# Patient Record
Sex: FEMALE | Race: WHITE | URBAN - METROPOLITAN AREA
[De-identification: names, ages, dates, MRNs, and addresses within clinical notes are randomized per-mention and may not be internally consistent; named-entity substitution may affect disease eponyms.]

---

## 2022-08-04 PROBLEM — N64.52 BLOODY DISCHARGE FROM LEFT NIPPLE: Status: ACTIVE | Noted: 2022-08-04

## 2022-08-29 PROBLEM — R87.810 CERVICAL HIGH RISK HPV (HUMAN PAPILLOMAVIRUS) TEST POSITIVE: Status: ACTIVE | Noted: 2022-07-11

## 2023-06-30 DIAGNOSIS — M75.42 IMPINGEMENT SYNDROME OF LEFT SHOULDER: ICD-10-CM

## 2023-06-30 DIAGNOSIS — M75.52 BURSITIS OF LEFT SHOULDER: ICD-10-CM

## 2023-06-30 DIAGNOSIS — M25.512 LEFT SHOULDER PAIN, UNSPECIFIED CHRONICITY: ICD-10-CM

## 2023-07-05 ENCOUNTER — OFFICE VISIT (OUTPATIENT)
Dept: ORTHOPEDIC SURGERY | Age: 43
End: 2023-07-05

## 2023-07-05 DIAGNOSIS — M25.512 LEFT SHOULDER PAIN, UNSPECIFIED CHRONICITY: Primary | ICD-10-CM

## 2023-07-05 DIAGNOSIS — S49.80XA SHOULDER INJURY RELATED TO VACCINE ADMINISTRATION (SIRVA): ICD-10-CM

## 2023-07-05 DIAGNOSIS — T50.Z95A SHOULDER INJURY RELATED TO VACCINE ADMINISTRATION (SIRVA): ICD-10-CM

## 2023-07-05 DIAGNOSIS — M75.52 BURSITIS OF LEFT SHOULDER: ICD-10-CM

## 2023-07-05 DIAGNOSIS — M75.42 IMPINGEMENT SYNDROME OF LEFT SHOULDER: ICD-10-CM

## 2023-07-05 NOTE — PROGRESS NOTES
Patient was fitted and instructed on the use of Arm Sling for the left shoulder. Patient is aware the arm should fit comfortably in the sling with the elbow as far back as possible. I explained the thumb should be placed in the thumb loop to help prevent the arm from sliding out of the sling. Patient was instructed that the shoulder strap should cross over the opposite shoulder continuing threw the loop attached to the sling and then velcro back on the shoulder strap. Patient read and signed documenting they understand and agree to [facility]'s current DME return policy.
cartilage thinning   and possible early subchondral changes anterior and inferiorly. 4.  Rotator cuff intact. Reviewed and shows some tendinopathy of the biceps as well as the findings above  There is possibly some splitting of the long head of the biceps prior to the groove. A/Plan:     ICD-10-CM    1. Left shoulder pain, unspecified chronicity  M25.512       2. Bursitis of left shoulder  M75.52       3. Shoulder injury related to vaccine administration (SIRVA)  S49.80XA     T50.Z95A       4. Impingement syndrome of left shoulder  M75.42            We discussed all her options. She has exhausted conservative treatment and has failed to have significant relief preventing her from performing activities that she enjoys in life. She would like to continue to race and has determined that she would like to proceed with surgery. I discussed with her based on her exam as well as review of the MRI that I think her symptoms are likely coming from the long head of her biceps and would proceed with arthroscopic debridement and open subpectoral biceps tenodesis and clinically indicated procedures. The patient desires arthroscopy of the left shoulder to include arthroscopic diagnostic evaluation with debridement and biceps tenotomy for open subpectoral biceps tenodesis. I talked with them extensively about the risks, benefits, reasonable expectations and expected recovery time as well as possible complications including but not limited to bleeding, infection, neurovascular injury, stiffness, cosmetic abnormality of skin or muscle for biceps related surgery, pain, continued problems, DVT, PE, hardware failure, fracture, heterotopic ossification, MI and other anesthesia related risks, etc.  They seem to understand and wish to proceed. I gave them education literature and answered their questions. Return for Surgery.         Rosaline Fothergill, MD  07/05/23

## 2023-07-10 ENCOUNTER — TELEPHONE (OUTPATIENT)
Dept: ORTHOPEDIC SURGERY | Age: 43
End: 2023-07-10

## 2023-07-10 NOTE — TELEPHONE ENCOUNTER
Spoke with pt and let her know that I have reached out to Estelle Rossi to give her a call with a time that she can come and  the ice machine. She expressed understanding and thanked me for calling.

## 2023-07-10 NOTE — TELEPHONE ENCOUNTER
She has decided she does want the ice man for her Aug 3 surgery. How does she get this? Please let her know.

## 2023-07-20 ENCOUNTER — OFFICE VISIT (OUTPATIENT)
Dept: ORTHOPEDIC SURGERY | Age: 43
End: 2023-07-20

## 2023-07-20 DIAGNOSIS — M25.512 LEFT SHOULDER PAIN, UNSPECIFIED CHRONICITY: Primary | ICD-10-CM

## 2023-07-20 NOTE — PROGRESS NOTES
I explained and demonstrated all information to the patient about how to properly use the machine. It will be used to help reduce pain and swelling, in turn facilitate healing and speed up the rehabilitation process. The patient was instructed on how to properly fill the machine with ice and water as well as the importance to ALWAYS use a barrier between your skin and the cold pad to avoid additional injury. The patient was instructed to take the machine to the hospital with them the morning of their surgery. Patient was advised that if they had any questions about the Ice Man Machine or how to properly use it to please call me at [phone]. Patient read and signed documenting they understand and agree to [facility]'s current DME return policy.

## 2023-07-31 NOTE — PERIOP NOTE
Patient verified name and . Order for consent NOT found in EHR at time of PAT visit. Unable to verify case posting against order; surgery verified by patient. Type 1B surgery, PAT phone assessment complete. Orders not received. Labs per surgeon: unknown; no orders received    Labs per anesthesia protocol: none indicated    Patient answered medical/surgical history questions at their best of ability. All prior to admission medications documented in EPIC. Patient instructed to take the following medications the day of surgery according to anesthesia guidelines with a small sip of water: none On the day before surgery please take Acetaminophen 1000mg in the morning and then again before bed. You may substitute for Tylenol 650 mg. Hold all vitamins 7 days prior to surgery and NSAIDS 5 days prior to surgery. Prescription meds to hold:vitamins and supplements  Patient instructed on the following:    > Arrive at [facility], time of arrival to be called the day before by 1700  > NPO after midnight, unless otherwise indicated, including gum, mints, and ice chips  > Responsible adult must drive patient to the hospital, stay during surgery, and patient will need supervision 24 hours after anesthesia  > Use antibacterial soap in shower the night before surgery and on the morning of surgery  > All piercings must be removed prior to arrival.    > Leave all valuables (money and jewelry) at home but bring insurance card and ID on DOS.   > You may be required to pay a deductible or co-pay on the day of your procedure. You can pre-pay by calling [phone] if your surgery is at the [facility] or [phone] if your surgery is at the [facility]. > Do not wear make-up, nail polish, lotions, cologne, perfumes, powders, or oil on skin. Artificial nails are not permitted.

## 2023-08-02 ENCOUNTER — ANESTHESIA EVENT (OUTPATIENT)
Dept: SURGERY | Age: 43
End: 2023-08-02
Payer: COMMERCIAL

## 2023-08-02 DIAGNOSIS — M75.42 IMPINGEMENT SYNDROME OF LEFT SHOULDER: Primary | ICD-10-CM

## 2023-08-02 RX ORDER — AMOXICILLIN 250 MG
1 CAPSULE ORAL DAILY
Qty: 21 TABLET | Refills: 0 | Status: SHIPPED | OUTPATIENT
Start: 2023-08-02

## 2023-08-02 RX ORDER — OXYCODONE AND ACETAMINOPHEN 7.5; 325 MG/1; MG/1
1-2 TABLET ORAL EVERY 6 HOURS PRN
Qty: 30 TABLET | Refills: 0 | Status: SHIPPED | OUTPATIENT
Start: 2023-08-02 | End: 2023-08-05

## 2023-08-02 RX ORDER — MELOXICAM 7.5 MG/1
7.5 TABLET ORAL 2 TIMES DAILY PRN
Qty: 28 TABLET | Refills: 0 | Status: SHIPPED | OUTPATIENT
Start: 2023-08-02 | End: 2023-08-16

## 2023-08-02 RX ORDER — NALOXONE HYDROCHLORIDE 4 MG/.1ML
1 SPRAY NASAL PRN
Qty: 1 EACH | Refills: 1 | Status: SHIPPED | OUTPATIENT
Start: 2023-08-02

## 2023-08-02 RX ORDER — ASPIRIN 325 MG
325 TABLET ORAL DAILY
Qty: 7 TABLET | Refills: 0 | Status: SHIPPED | OUTPATIENT
Start: 2023-08-02 | End: 2023-08-09

## 2023-08-02 RX ORDER — ONDANSETRON 8 MG/1
4 TABLET, ORALLY DISINTEGRATING ORAL EVERY 6 HOURS
Qty: 16 TABLET | Refills: 0 | Status: SHIPPED | OUTPATIENT
Start: 2023-08-02

## 2023-08-02 NOTE — PERIOP NOTE
Preop department called to notify patient of arrival time for scheduled procedure. Instructions given to   - Arrive at [address]. - Remain NPO after midnight, unless otherwise indicated, including gum, mints, and ice chips. - Have a responsible adult to drive patient to the hospital, stay during surgery, and patient will need supervision 24 hours after anesthesia. - Use antibacterial soap in shower the night before surgery and on the morning of surgery.        Was patient contacted: Yes  Voicemail left:   Numbers contacted: [phone]   Arrival time: 2187

## 2023-08-03 ENCOUNTER — HOSPITAL ENCOUNTER (OUTPATIENT)
Age: 43
Setting detail: OUTPATIENT SURGERY
Discharge: HOME | End: 2023-08-03
Attending: ORTHOPAEDIC SURGERY | Admitting: ORTHOPAEDIC SURGERY
Payer: COMMERCIAL

## 2023-08-03 ENCOUNTER — ANESTHESIA (OUTPATIENT)
Dept: SURGERY | Age: 43
End: 2023-08-03
Payer: COMMERCIAL

## 2023-08-03 ENCOUNTER — TELEPHONE (OUTPATIENT)
Dept: ORTHOPEDIC SURGERY | Age: 43
End: 2023-08-03

## 2023-08-03 VITALS
TEMPERATURE: 97 F | DIASTOLIC BLOOD PRESSURE: 57 MMHG | HEIGHT: 68 IN | OXYGEN SATURATION: 95 % | SYSTOLIC BLOOD PRESSURE: 132 MMHG | RESPIRATION RATE: 18 BRPM | WEIGHT: 148 LBS | BODY MASS INDEX: 22.43 KG/M2 | HEART RATE: 87 BPM

## 2023-08-03 DIAGNOSIS — M75.52 BURSITIS OF LEFT SHOULDER: ICD-10-CM

## 2023-08-03 DIAGNOSIS — S49.80XA SHOULDER INJURY RELATED TO VACCINE ADMINISTRATION (SIRVA): ICD-10-CM

## 2023-08-03 DIAGNOSIS — M75.42 IMPINGEMENT SYNDROME OF LEFT SHOULDER: ICD-10-CM

## 2023-08-03 DIAGNOSIS — T50.Z95A SHOULDER INJURY RELATED TO VACCINE ADMINISTRATION (SIRVA): ICD-10-CM

## 2023-08-03 LAB — HCG UR QL: NEGATIVE

## 2023-08-03 PROCEDURE — 7100000001 HC PACU RECOVERY - ADDTL 15 MIN: Performed by: ORTHOPAEDIC SURGERY

## 2023-08-03 PROCEDURE — C1713 ANCHOR/SCREW BN/BN,TIS/BN: HCPCS | Performed by: ORTHOPAEDIC SURGERY

## 2023-08-03 PROCEDURE — 64415 NJX AA&/STRD BRCH PLXS IMG: CPT | Performed by: ANESTHESIOLOGY

## 2023-08-03 PROCEDURE — 2580000003 HC RX 258

## 2023-08-03 PROCEDURE — 7100000011 HC PHASE II RECOVERY - ADDTL 15 MIN: Performed by: ORTHOPAEDIC SURGERY

## 2023-08-03 PROCEDURE — 6370000000 HC RX 637 (ALT 250 FOR IP): Performed by: ANESTHESIOLOGY

## 2023-08-03 PROCEDURE — 7100000010 HC PHASE II RECOVERY - FIRST 15 MIN: Performed by: ORTHOPAEDIC SURGERY

## 2023-08-03 PROCEDURE — 6360000002 HC RX W HCPCS

## 2023-08-03 PROCEDURE — 6360000002 HC RX W HCPCS: Performed by: ANESTHESIOLOGY

## 2023-08-03 PROCEDURE — 6360000002 HC RX W HCPCS: Performed by: ORTHOPAEDIC SURGERY

## 2023-08-03 PROCEDURE — 3600000004 HC SURGERY LEVEL 4 BASE: Performed by: ORTHOPAEDIC SURGERY

## 2023-08-03 PROCEDURE — 7100000000 HC PACU RECOVERY - FIRST 15 MIN: Performed by: ORTHOPAEDIC SURGERY

## 2023-08-03 PROCEDURE — 2580000003 HC RX 258: Performed by: ANESTHESIOLOGY

## 2023-08-03 PROCEDURE — 3700000001 HC ADD 15 MINUTES (ANESTHESIA): Performed by: ORTHOPAEDIC SURGERY

## 2023-08-03 PROCEDURE — 3700000000 HC ANESTHESIA ATTENDED CARE: Performed by: ORTHOPAEDIC SURGERY

## 2023-08-03 PROCEDURE — 81025 URINE PREGNANCY TEST: CPT

## 2023-08-03 PROCEDURE — 2500000003 HC RX 250 WO HCPCS: Performed by: ORTHOPAEDIC SURGERY

## 2023-08-03 PROCEDURE — 2720000010 HC SURG SUPPLY STERILE: Performed by: ORTHOPAEDIC SURGERY

## 2023-08-03 PROCEDURE — 2500000003 HC RX 250 WO HCPCS

## 2023-08-03 PROCEDURE — 3600000014 HC SURGERY LEVEL 4 ADDTL 15MIN: Performed by: ORTHOPAEDIC SURGERY

## 2023-08-03 PROCEDURE — 6360000002 HC RX W HCPCS: Performed by: PHYSICIAN ASSISTANT

## 2023-08-03 PROCEDURE — 2709999900 HC NON-CHARGEABLE SUPPLY: Performed by: ORTHOPAEDIC SURGERY

## 2023-08-03 DEVICE — ANCHOR SUT DIA2.6MM SFT ROT CUF FIBERTAK: Type: IMPLANTABLE DEVICE | Site: SHOULDER | Status: FUNCTIONAL

## 2023-08-03 RX ORDER — TRANEXAMIC ACID 100 MG/ML
INJECTION, SOLUTION INTRAVENOUS PRN
Status: DISCONTINUED | OUTPATIENT
Start: 2023-08-03 | End: 2023-08-03 | Stop reason: SDUPTHER

## 2023-08-03 RX ORDER — EPHEDRINE SULFATE/0.9% NACL/PF 50 MG/5 ML
SYRINGE (ML) INTRAVENOUS PRN
Status: DISCONTINUED | OUTPATIENT
Start: 2023-08-03 | End: 2023-08-03 | Stop reason: SDUPTHER

## 2023-08-03 RX ORDER — ROCURONIUM BROMIDE 10 MG/ML
INJECTION, SOLUTION INTRAVENOUS PRN
Status: DISCONTINUED | OUTPATIENT
Start: 2023-08-03 | End: 2023-08-03 | Stop reason: SDUPTHER

## 2023-08-03 RX ORDER — NEOSTIGMINE METHYLSULFATE 1 MG/ML
INJECTION, SOLUTION INTRAVENOUS PRN
Status: DISCONTINUED | OUTPATIENT
Start: 2023-08-03 | End: 2023-08-03 | Stop reason: SDUPTHER

## 2023-08-03 RX ORDER — DEXAMETHASONE SODIUM PHOSPHATE 4 MG/ML
INJECTION, SOLUTION INTRA-ARTICULAR; INTRALESIONAL; INTRAMUSCULAR; INTRAVENOUS; SOFT TISSUE PRN
Status: DISCONTINUED | OUTPATIENT
Start: 2023-08-03 | End: 2023-08-03 | Stop reason: SDUPTHER

## 2023-08-03 RX ORDER — GLYCOPYRROLATE 0.2 MG/ML
INJECTION INTRAMUSCULAR; INTRAVENOUS PRN
Status: DISCONTINUED | OUTPATIENT
Start: 2023-08-03 | End: 2023-08-03 | Stop reason: SDUPTHER

## 2023-08-03 RX ORDER — ROPIVACAINE HYDROCHLORIDE 5 MG/ML
INJECTION, SOLUTION EPIDURAL; INFILTRATION; PERINEURAL
Status: COMPLETED | OUTPATIENT
Start: 2023-08-03 | End: 2023-08-03

## 2023-08-03 RX ORDER — OXYCODONE HYDROCHLORIDE 5 MG/1
5 TABLET ORAL
Status: COMPLETED | OUTPATIENT
Start: 2023-08-03 | End: 2023-08-03

## 2023-08-03 RX ORDER — SODIUM CHLORIDE 0.9 % (FLUSH) 0.9 %
5-40 SYRINGE (ML) INJECTION PRN
Status: DISCONTINUED | OUTPATIENT
Start: 2023-08-03 | End: 2023-08-03 | Stop reason: HOSPADM

## 2023-08-03 RX ORDER — ONDANSETRON 2 MG/ML
4 INJECTION INTRAMUSCULAR; INTRAVENOUS
Status: DISCONTINUED | OUTPATIENT
Start: 2023-08-03 | End: 2023-08-03 | Stop reason: HOSPADM

## 2023-08-03 RX ORDER — PROPOFOL 10 MG/ML
INJECTION, EMULSION INTRAVENOUS PRN
Status: DISCONTINUED | OUTPATIENT
Start: 2023-08-03 | End: 2023-08-03 | Stop reason: SDUPTHER

## 2023-08-03 RX ORDER — ACETAMINOPHEN 500 MG
1000 TABLET ORAL ONCE
Status: COMPLETED | OUTPATIENT
Start: 2023-08-03 | End: 2023-08-03

## 2023-08-03 RX ORDER — SODIUM CHLORIDE 9 MG/ML
INJECTION, SOLUTION INTRAVENOUS PRN
Status: DISCONTINUED | OUTPATIENT
Start: 2023-08-03 | End: 2023-08-03 | Stop reason: HOSPADM

## 2023-08-03 RX ORDER — SODIUM CHLORIDE, SODIUM LACTATE, POTASSIUM CHLORIDE, CALCIUM CHLORIDE 600; 310; 30; 20 MG/100ML; MG/100ML; MG/100ML; MG/100ML
INJECTION, SOLUTION INTRAVENOUS CONTINUOUS
Status: DISCONTINUED | OUTPATIENT
Start: 2023-08-03 | End: 2023-08-03 | Stop reason: HOSPADM

## 2023-08-03 RX ORDER — SODIUM CHLORIDE 0.9 % (FLUSH) 0.9 %
5-40 SYRINGE (ML) INJECTION EVERY 12 HOURS SCHEDULED
Status: DISCONTINUED | OUTPATIENT
Start: 2023-08-03 | End: 2023-08-03 | Stop reason: HOSPADM

## 2023-08-03 RX ORDER — ONDANSETRON 2 MG/ML
INJECTION INTRAMUSCULAR; INTRAVENOUS PRN
Status: DISCONTINUED | OUTPATIENT
Start: 2023-08-03 | End: 2023-08-03 | Stop reason: SDUPTHER

## 2023-08-03 RX ORDER — HYDROMORPHONE HYDROCHLORIDE 2 MG/ML
0.5 INJECTION, SOLUTION INTRAMUSCULAR; INTRAVENOUS; SUBCUTANEOUS EVERY 5 MIN PRN
Status: DISCONTINUED | OUTPATIENT
Start: 2023-08-03 | End: 2023-08-03 | Stop reason: HOSPADM

## 2023-08-03 RX ORDER — LIDOCAINE HYDROCHLORIDE 20 MG/ML
INJECTION, SOLUTION EPIDURAL; INFILTRATION; INTRACAUDAL; PERINEURAL PRN
Status: DISCONTINUED | OUTPATIENT
Start: 2023-08-03 | End: 2023-08-03 | Stop reason: SDUPTHER

## 2023-08-03 RX ORDER — FENTANYL CITRATE 50 UG/ML
INJECTION, SOLUTION INTRAMUSCULAR; INTRAVENOUS PRN
Status: DISCONTINUED | OUTPATIENT
Start: 2023-08-03 | End: 2023-08-03 | Stop reason: SDUPTHER

## 2023-08-03 RX ORDER — DIPHENHYDRAMINE HYDROCHLORIDE 50 MG/ML
12.5 INJECTION INTRAMUSCULAR; INTRAVENOUS
Status: DISCONTINUED | OUTPATIENT
Start: 2023-08-03 | End: 2023-08-03 | Stop reason: HOSPADM

## 2023-08-03 RX ORDER — LIDOCAINE HYDROCHLORIDE AND EPINEPHRINE 10; 10 MG/ML; UG/ML
INJECTION, SOLUTION INFILTRATION; PERINEURAL PRN
Status: DISCONTINUED | OUTPATIENT
Start: 2023-08-03 | End: 2023-08-03 | Stop reason: ALTCHOICE

## 2023-08-03 RX ORDER — MIDAZOLAM HYDROCHLORIDE 2 MG/2ML
2 INJECTION, SOLUTION INTRAMUSCULAR; INTRAVENOUS
Status: COMPLETED | OUTPATIENT
Start: 2023-08-03 | End: 2023-08-03

## 2023-08-03 RX ORDER — SCOLOPAMINE TRANSDERMAL SYSTEM 1 MG/1
1 PATCH, EXTENDED RELEASE TRANSDERMAL
Status: DISCONTINUED | OUTPATIENT
Start: 2023-08-03 | End: 2023-08-03 | Stop reason: HOSPADM

## 2023-08-03 RX ORDER — EPINEPHRINE 1 MG/ML(1)
AMPUL (ML) INJECTION PRN
Status: DISCONTINUED | OUTPATIENT
Start: 2023-08-03 | End: 2023-08-03 | Stop reason: ALTCHOICE

## 2023-08-03 RX ORDER — APREPITANT 40 MG/1
40 CAPSULE ORAL ONCE
Status: COMPLETED | OUTPATIENT
Start: 2023-08-03 | End: 2023-08-03

## 2023-08-03 RX ORDER — FENTANYL CITRATE 50 UG/ML
100 INJECTION, SOLUTION INTRAMUSCULAR; INTRAVENOUS
Status: COMPLETED | OUTPATIENT
Start: 2023-08-03 | End: 2023-08-03

## 2023-08-03 RX ADMIN — TRANEXAMIC ACID 1000 MG: 100 INJECTION, SOLUTION INTRAVENOUS at 11:46

## 2023-08-03 RX ADMIN — ONDANSETRON 4 MG: 2 INJECTION INTRAMUSCULAR; INTRAVENOUS at 11:46

## 2023-08-03 RX ADMIN — ROPIVACAINE HYDROCHLORIDE 30 ML: 5 INJECTION, SOLUTION EPIDURAL; INFILTRATION; PERINEURAL at 10:42

## 2023-08-03 RX ADMIN — ACETAMINOPHEN 1000 MG: 500 TABLET, FILM COATED ORAL at 09:11

## 2023-08-03 RX ADMIN — APREPITANT 40 MG: 40 CAPSULE ORAL at 09:34

## 2023-08-03 RX ADMIN — FENTANYL CITRATE 50 MCG: 50 INJECTION, SOLUTION INTRAMUSCULAR; INTRAVENOUS at 12:45

## 2023-08-03 RX ADMIN — Medication 10 MG: at 11:58

## 2023-08-03 RX ADMIN — FENTANYL CITRATE 50 MCG: 50 INJECTION, SOLUTION INTRAMUSCULAR; INTRAVENOUS at 12:26

## 2023-08-03 RX ADMIN — SODIUM CHLORIDE, SODIUM LACTATE, POTASSIUM CHLORIDE, AND CALCIUM CHLORIDE: 600; 310; 30; 20 INJECTION, SOLUTION INTRAVENOUS at 09:12

## 2023-08-03 RX ADMIN — PHENYLEPHRINE HYDROCHLORIDE 50 MCG: 10 INJECTION INTRAVENOUS at 12:06

## 2023-08-03 RX ADMIN — OXYCODONE HYDROCHLORIDE 5 MG: 5 TABLET ORAL at 13:31

## 2023-08-03 RX ADMIN — GLYCOPYRROLATE 0.4 MG: 0.2 INJECTION INTRAMUSCULAR; INTRAVENOUS at 12:42

## 2023-08-03 RX ADMIN — SODIUM CHLORIDE, SODIUM LACTATE, POTASSIUM CHLORIDE, AND CALCIUM CHLORIDE: 600; 310; 30; 20 INJECTION, SOLUTION INTRAVENOUS at 12:44

## 2023-08-03 RX ADMIN — DEXAMETHASONE SODIUM PHOSPHATE 4 MG: 4 INJECTION, SOLUTION INTRAMUSCULAR; INTRAVENOUS at 11:46

## 2023-08-03 RX ADMIN — Medication 3 MG: at 12:42

## 2023-08-03 RX ADMIN — Medication 5 MG: at 12:17

## 2023-08-03 RX ADMIN — Medication 2000 MG: at 11:46

## 2023-08-03 RX ADMIN — ROCURONIUM BROMIDE 40 MG: 10 INJECTION, SOLUTION INTRAVENOUS at 11:50

## 2023-08-03 RX ADMIN — Medication 5 MG: at 12:01

## 2023-08-03 RX ADMIN — PHENYLEPHRINE HYDROCHLORIDE 50 MCG: 10 INJECTION INTRAVENOUS at 12:24

## 2023-08-03 RX ADMIN — PHENYLEPHRINE HYDROCHLORIDE 100 MCG: 10 INJECTION INTRAVENOUS at 12:36

## 2023-08-03 RX ADMIN — PROPOFOL 200 MG: 10 INJECTION, EMULSION INTRAVENOUS at 11:50

## 2023-08-03 RX ADMIN — PHENYLEPHRINE HYDROCHLORIDE 50 MCG: 10 INJECTION INTRAVENOUS at 12:01

## 2023-08-03 RX ADMIN — LIDOCAINE HYDROCHLORIDE 60 MG: 20 INJECTION, SOLUTION EPIDURAL; INFILTRATION; INTRACAUDAL; PERINEURAL at 11:50

## 2023-08-03 RX ADMIN — FENTANYL CITRATE 100 MCG: 50 INJECTION INTRAMUSCULAR; INTRAVENOUS at 10:42

## 2023-08-03 RX ADMIN — PHENYLEPHRINE HYDROCHLORIDE 50 MCG: 10 INJECTION INTRAVENOUS at 12:17

## 2023-08-03 RX ADMIN — PHENYLEPHRINE HYDROCHLORIDE 50 MCG: 10 INJECTION INTRAVENOUS at 12:33

## 2023-08-03 RX ADMIN — MIDAZOLAM 2 MG: 1 INJECTION INTRAMUSCULAR; INTRAVENOUS at 10:42

## 2023-08-03 RX ADMIN — Medication 5 MG: at 12:24

## 2023-08-03 ASSESSMENT — PAIN DESCRIPTION - ORIENTATION: ORIENTATION: LEFT

## 2023-08-03 ASSESSMENT — PAIN DESCRIPTION - LOCATION: LOCATION: SHOULDER

## 2023-08-03 ASSESSMENT — PAIN SCALES - GENERAL
PAINLEVEL_OUTOF10: 4
PAINLEVEL_OUTOF10: 0

## 2023-08-03 NOTE — ANESTHESIA PROCEDURE NOTES
Airway  Date/Time: 8/3/2023 11:53 AM  Urgency: elective    Airway not difficult    General Information and Staff    Patient location during procedure: OR  Resident/CRNA: OSVALDO Lo CRNA  Performed: resident/CRNA     Indications and Patient Condition  Indications for airway management: anesthesia  Spontaneous Ventilation: absent  Sedation level: deep  Preoxygenated: yes  Patient position: sniffing  MILS not maintained throughout  Mask difficulty assessment: vent by bag mask    Final Airway Details  Final airway type: endotracheal airway      Successful airway: ETT  Cuffed: yes   Successful intubation technique: direct laryngoscopy  Facilitating devices/methods: intubating stylet  Endotracheal tube insertion site: oral  Blade: Johnathan  Blade size: #3  ETT size (mm): 7.0  Cormack-Lehane Classification: grade I - full view of glottis  Placement verified by: chest auscultation and capnometry   Measured from: lips  ETT to lips (cm): 21  Number of attempts at approach: 1  Ventilation between attempts: bag mask  Number of other approaches attempted: 0    Additional Comments  PreO2. Standard IV induction by MDA. Atraumatic insertion. Positive equal and bilateral breath sounds noted with positive ETCO2 present. Lips and dentition unchanged from pre-op.     no
adjunct  Medications Administered  ropivacaine (NAROPIN) injection 0.5% - Perineural   30 mL - 8/3/2023 10:42:00 AM

## 2023-08-03 NOTE — PERIOP NOTE
PACU DISCHARGE NOTE    Pt and fiance verbalized understanding of discharge inst. Pt tolerated po fluids well. Pt discharged home with iceman to left shoulder. Vital signs stable, pain well controlled, alert and oriented times three or at baseline, follow up per surgeon, no anesthetic complications.

## 2023-08-03 NOTE — ANESTHESIA PRE PROCEDURE
Department of Anesthesiology  Preprocedure Note       Name:  Kanwal Pat   Age:  38 y.o.  :  1980                                          MRN:  [id number]         Date:  8/3/2023      Surgeon: Trinidad Live): Moriah Nassar MD    Procedure: Procedure(s):  LEFT SHOULDER SCOPE DEBRIDEMENT -REGIONAL BLOCK  OPEN BICEPS TENODESIS -REGIONAL BLOCK    Medications prior to admission:   Prior to Admission medications    Medication Sig Start Date End Date Taking? Authorizing Provider   NONFORMULARY Cardona Juice- Super Hair Vitamin   Yes Historical Provider, MD   aspirin 325 MG tablet Take 1 tablet by mouth daily for 7 days Start after surgery 23  OG Dsouza   meloxicam (MOBIC) 7.5 MG tablet Take 1 tablet by mouth 2 times daily as needed for Pain Take 1 tablet po BID for 2 weeks as needed for pain 23  OG Dsouza   naloxone [facility]) 4 MG/0.1ML LIQD nasal spray 1 spray by Nasal route as needed for Opioid Reversal 23   OG Dsouza   oxyCODONE-acetaminophen (PERCOCET) 7.5-325 MG per tablet Take 1-2 tablets by mouth every 6 hours as needed for Pain for up to 3 days. Start medication night of surgery. Max Daily Amount: 8 tablets 23  OG Dsouza   senna-docusate (PERICOLACE) 8.6-50 MG per tablet Take 1 tablet by mouth daily Take for constipation prophylaxis 23   OG Dsouza   ondansetron (ZOFRAN-ODT) 8 MG TBDP disintegrating tablet Place 0.5 tablets under the tongue in the morning and 0.5 tablets at noon and 0.5 tablets in the evening and 0.5 tablets before bedtime. Take 20 minutes prior to pain medication for nausea prevention.  23   OG Dsouza   Turmeric (QC TUMERIC COMPLEX PO) Take by mouth Once a day, PM  Patient not taking: Reported on 2023    Historical Provider, MD   loratadine (CLARITIN) 10 MG capsule Take 10 mg by mouth daily  Patient not taking: Reported on 2023    Historical Provider, MD   naproxen (EC-NAPROSYN) 500 MG EC tablet

## 2023-08-03 NOTE — OP NOTE
Operative Note    Preoperative diagnosis:  Left shoulder pain, unspecified chronicity [M25.512]  Bursitis of left shoulder [M75.52]  Shoulder injury related to vaccine administration (SIRVA) [S49.80XA, T50.Z95A]  Impingement syndrome of left shoulder [M75.42]    Postoperative diagnosis: Left shoulder pain, bicipital instability/tendinitis    Surgeon(s) and Role:     * RAHEL Duffy MD - Primary     Assistant: first jackie Tolliver, assisted during the procedure. She was necessary for patient positioning, wound closure, and assistance with the major portions of the operation. Her presence decreased the operative time and potential complication rate. Anesthesia: Choice    Antibiotics: Ancef 2 g IV    Procedures:  Procedure(s):  LEFT SHOULDER SCOPE DEBRIDEMENT  OPEN BICEPS TENODESIS 81440  Biceps open subpectoral Tenodesis 70483    Findings:  1. EUA -patient had full range of motion that was symmetric both sides with no evidence of instability. 2. Intra-articular -the glenoid cartilage centrally in the humeral head cartilage was normal.  There was mild fraying of the anterior chondral labral junction but no evidence of tearing or instability to probing. This was debrided with a shaver. The superior labrum appeared relatively stable to probing. The tendon itself was fairly normal but when probed the biceps tendon at the superior aspect of the groove would sublux over the anterior aspect and superior aspect of the subscapularis both with probing but also with dynamic abduction shear maneuver. The rotator cuff appeared normal from the articular side  3. Subacromial -mild bursitis, no evidence of significant rotator cuff pathology or impingement  4. AC joint -asymptomatic    Indications / Consent:  this is a patient who has persistent pain with some weakness in the right shoulder. They had an MRI that showed possible bicipital instability or medial subluxation.   After previous discussions and

## 2023-08-03 NOTE — TELEPHONE ENCOUNTER
She had surgery this morning and an antibiotic was never sent in. Please send to the pharmacy on file. She said there was some back and forth on what she can take and maybe that's why it was never sent.

## 2023-08-08 ENCOUNTER — HOSPITAL ENCOUNTER (OUTPATIENT)
Dept: PHYSICAL THERAPY | Age: 43
Setting detail: RECURRING SERIES
Discharge: HOME | End: 2023-08-11
Attending: ORTHOPAEDIC SURGERY
Payer: COMMERCIAL

## 2023-08-08 PROCEDURE — 97162 PT EVAL MOD COMPLEX 30 MIN: CPT

## 2023-08-08 PROCEDURE — 97140 MANUAL THERAPY 1/> REGIONS: CPT

## 2023-08-08 PROCEDURE — 97016 VASOPNEUMATIC DEVICE THERAPY: CPT

## 2023-08-08 ASSESSMENT — PAIN SCALES - GENERAL: PAINLEVEL_OUTOF10: 2

## 2023-08-08 NOTE — PROGRESS NOTES
Mike Colbert  : 1980  Primary: Maryeunice Robbins ([employer] QUINTANILLA)  Secondary:  [address] [facility] [address]  [facility] [address]  Phone: [phone]  Fax: [phone] Plan Frequency: twice a week for 12 weeks    Plan of Care/Certification Expiration Date: 23      PT Visit Info:  Plan Frequency: twice a week for 12 weeks  Plan of Care/Certification Expiration Date: 23  Progress Note Counter: 1      Visit Count:  1    OUTPATIENT PHYSICAL THERAPY:OP NOTE TYPE: Treatment Note 2023       Episode  }Appt Desk             Treatment Diagnosis:  Pain in left shoulder (M25.512)   Stiffness of left shoulder, not elsewhere classified (M25.612)    Medical/Referring Diagnosis:  Left shoulder pain, unspecified chronicity [M25.512]  Bursitis of left shoulder [M75.52]  Shoulder injury related to vaccine administration (SIRVA) [S49.80XA, T50.Z95A]  Impingement syndrome of left shoulder [M75.42]  Referring Physician:  Loki Mederos MD MD Orders:  PT Eval and Treat   Date of Onset:  Onset Date: 23     Allergies:   Ciprofloxacin, Amoxicillin, and Penicillins  Restrictions/Precautions:  Restrictions/Precautions: Other (comment) (must wear sling at all times for 4 weeks, then 2 weeks outside of the house; states she cannot pick any weighted item up with her L UE)  No data recorded      Subjective Comments:  See history in chart.   Initial:}    2 (2-3)/10Post Session:        (no specific pain level reported)/10  Medications Last Reviewed:  2023  Updated Objective Findings:  See evaluation note from today  Treatment   In addition to assessment today, the following treatments were provided:   Manual Therapy: (13 minutes)   With pt in supported supine position:   - passive ROM to L shoulder (with L elbow flexed) into flexion, abduction, and ER at 15 degrees abduction to maximize L shoulder mobility and reduce pain    Modalities: (10 minutes) With pt in supported sitting

## 2023-08-08 NOTE — THERAPY EVALUATION
Peter Dayana  : 1980  Primary: Luisakim Whitmorejoseph Robbins ([employer])  Secondary:  [address] [facility] [address] [facility] [address]  Phone: [phone]  Fax: [phone] Plan Frequency: twice a week for 12 weeks    Plan of Care/Certification Expiration Date: 23      PT Visit Info:  Plan Frequency: twice a week for 12 weeks  Plan of Care/Certification Expiration Date: 23  Progress Note Counter: 1      Visit Count:  1                OUTPATIENT PHYSICAL THERAPY:             OP NOTE TYPE: Initial Assessment 2023               Episode (L shoulder pain s/p surgery) Appt Desk         Treatment Diagnosis:  Pain in left shoulder (M25.512)   Stiffness of left shoulder, not elsewhere classified (M25.612)    Medical/Referring Diagnosis:  Left shoulder pain, unspecified chronicity [M25.512]  Bursitis of left shoulder [M75.52]  Shoulder injury related to vaccine administration (SIRVA) [S49.80XA, T50.Z95A]  Impingement syndrome of left shoulder [M75.42]  Referring Physician:  Marcela Clemens MD MD Orders:  PT Eval and Treat   Return MD Appt:  unspecified  Date of Onset:  Onset Date: 23      Allergies:  Ciprofloxacin, Amoxicillin, and Penicillins  Restrictions/Precautions:    Restrictions/Precautions: Other (comment) (must wear sling at all times for 4 weeks, then 2 weeks outside of the house; states she cannot pick any weighted item up with her L UE)        Medications Last Reviewed:  2023     SUBJECTIVE   History of Injury/Illness (Reason for Referral):  Location(s) of Injury: L shoulder  Mechanism of Injury: pt had tetanus shot in L shoulder and has been having issues ever since (thinks this is due to bursitis, which may have led to her having to get surgery for biceps tendon per her report)  Date of Surgery:  Onset Date: 23      Type of Surgery: left shoulder scope debridement, open biceps tenodesis  Pain at Worst: 6-7/10  Aggravating

## 2023-08-11 ENCOUNTER — HOSPITAL ENCOUNTER (OUTPATIENT)
Dept: PHYSICAL THERAPY | Age: 43
Setting detail: RECURRING SERIES
Discharge: HOME | End: 2023-08-14
Attending: ORTHOPAEDIC SURGERY
Payer: COMMERCIAL

## 2023-08-11 PROCEDURE — 97140 MANUAL THERAPY 1/> REGIONS: CPT

## 2023-08-11 PROCEDURE — 97016 VASOPNEUMATIC DEVICE THERAPY: CPT

## 2023-08-11 PROCEDURE — 97110 THERAPEUTIC EXERCISES: CPT

## 2023-08-11 ASSESSMENT — PAIN SCALES - GENERAL: PAINLEVEL_OUTOF10: 3

## 2023-08-11 NOTE — PROGRESS NOTES
Mihir Rojelio  : 1980  Primary: Malgorzata Robbins ([facility])  Secondary:  Sharee Marie  [place] [address] [address] [facility]  Phone: [phone]  Fax: [phone] Plan Frequency: twice a week for 12 weeks    Plan of Care/Certification Expiration Date: 23      PT Visit Info:  Plan Frequency: twice a week for 12 weeks  Plan of Care/Certification Expiration Date: 23  Progress Note Counter: 2      Visit Count:  2    OUTPATIENT PHYSICAL THERAPY:OP NOTE TYPE: Treatment Note 2023       Episode  }Appt Desk             Treatment Diagnosis:  Pain in left shoulder (M25.512)   Stiffness of left shoulder, not elsewhere classified (M25.612)    Medical/Referring Diagnosis:  Left shoulder pain, unspecified chronicity [M25.512]  Bursitis of left shoulder [M75.52]  Shoulder injury related to vaccine administration (SIRVA) [S49.80XA, T50.Z95A]  Impingement syndrome of left shoulder [M75.42]  Referring Physician:  Diana Ramirez MD MD Orders:  PT Eval and Treat   Date of Onset:  Onset Date: 23     Allergies:   Ciprofloxacin, Amoxicillin, and Penicillins  Restrictions/Precautions:  Restrictions/Precautions: Other (comment) (must wear sling at all times for 4 weeks, then 2 weeks outside of the house; states she cannot pick any weighted item up with her L UE)  No data recorded      Subjective Comments:  Pt states she had to leave from work because even though she is sitting at a desk it is still too much for her (with sitting upright without pillow underneath arm)  Initial:}    3/10Post Session:       3 (3-4)/10  Medications Last Reviewed:  2023  Updated Objective Findings:  None Today  Treatment   Therapeutic Exercise: ( 8 minutes):  Exercises per grid below to improve mobility, strength, and dynamic movement of left shoulder to improve functional lifting, carrying, reaching, and overhead activites.   Required minimal visual, verbal, and tactile cues to

## 2023-08-14 ENCOUNTER — HOSPITAL ENCOUNTER (OUTPATIENT)
Dept: PHYSICAL THERAPY | Age: 43
Setting detail: RECURRING SERIES
Discharge: HOME | End: 2023-08-17
Attending: ORTHOPAEDIC SURGERY
Payer: COMMERCIAL

## 2023-08-14 PROCEDURE — 97016 VASOPNEUMATIC DEVICE THERAPY: CPT

## 2023-08-14 PROCEDURE — 97110 THERAPEUTIC EXERCISES: CPT

## 2023-08-14 PROCEDURE — 97140 MANUAL THERAPY 1/> REGIONS: CPT

## 2023-08-14 ASSESSMENT — PAIN SCALES - GENERAL: PAINLEVEL_OUTOF10: 4

## 2023-08-14 NOTE — PROGRESS NOTES
Greg Parrish  : 1980  Primary: Thao Kincaid Sc ([facility])  Secondary:  Viji Bauer  [place] [address] [place] [address]  Phone: [phone]  Fax: [phone] Plan Frequency: twice a week for 12 weeks    Plan of Care/Certification Expiration Date: 23      PT Visit Info:  Plan Frequency: twice a week for 12 weeks  Plan of Care/Certification Expiration Date: 23  Progress Note Counter: 3      Visit Count:  3    OUTPATIENT PHYSICAL THERAPY:OP NOTE TYPE: Treatment Note 2023       Episode  }Appt Desk             Treatment Diagnosis:  Pain in left shoulder (M25.512)   Stiffness of left shoulder, not elsewhere classified (M25.612)    Medical/Referring Diagnosis:  Left shoulder pain, unspecified chronicity [M25.512]  Bursitis of left shoulder [M75.52]  Shoulder injury related to vaccine administration (SIRVA) [S49.80XA, T50.Z95A]  Impingement syndrome of left shoulder [M75.42]  Referring Physician:  Kylie Maldonado MD MD Orders:  PT Eval and Treat   Date of Onset:  Onset Date: 23     Allergies:   Ciprofloxacin, Amoxicillin, and Penicillins  Restrictions/Precautions:  Restrictions/Precautions: Other (comment) (must wear sling at all times for 4 weeks, then 2 weeks outside of the house; states she cannot pick any weighted item up with her L UE)  No data recorded      Subjective Comments:  Pt states she is having more pain right now since it is later in the day and she has been sitting up more  Initial:}    4/10Post Session:       4 (4-5)/10  Medications Last Reviewed:  2023  Updated Objective Findings:  None Today  Treatment   Therapeutic Exercise: ( 8 minutes):  Exercises per grid below to improve mobility, strength, and dynamic movement of left shoulder to improve functional lifting, carrying, reaching, and overhead activites.   Required minimal visual, verbal, and tactile cues to promote proper body alignment, promote proper body posture,

## 2023-08-15 ENCOUNTER — OFFICE VISIT (OUTPATIENT)
Dept: ORTHOPEDIC SURGERY | Age: 43
End: 2023-08-15

## 2023-08-15 DIAGNOSIS — Z09 SURGERY FOLLOW-UP: ICD-10-CM

## 2023-08-15 DIAGNOSIS — M25.512 LEFT SHOULDER PAIN, UNSPECIFIED CHRONICITY: Primary | ICD-10-CM

## 2023-08-15 PROCEDURE — 99024 POSTOP FOLLOW-UP VISIT: CPT | Performed by: ORTHOPAEDIC SURGERY

## 2023-08-15 NOTE — PROGRESS NOTES
Name: Yani Christian  YOB: 1980  Gender: female  MRN: [id number]    CC:   Chief Complaint   Patient presents with    Follow-up     1st post op left shoulder scope debridement, open biceps tenodesis DOS 8-3-23   1-2 weeks status post   Left Shoulder Scope Debridement - Left and Open Biceps Tenodesis - Left  8/3/2023    HPI: Patient is status post biceps tenodesis on 8-3-2023 patient is doing well. Patient came in with a sling on the affected side. Review of Systems  Noncontributory      PE:  Wounds are Clean, Dry and Intact. There is no sign of infection. The patient is neurovascularly intact. Swelling is within normal limits. A/P:     ICD-10-CM    1. Left shoulder pain, unspecified chronicity  M25.512       2. Surgery follow-up  Z09         Sutures were removed and were covered with Steri-strips. Discussed HEP. Recommend therapy to evaluate and treat current complaints and pathology. A home exercise program was also discussed with the patient. They will continue with use of the sling at this time. Return in about 3 weeks (around 9/5/2023) for post op on Hao's schedule.       Dominique Cerda MD  08/15/23

## 2023-08-16 ENCOUNTER — HOSPITAL ENCOUNTER (OUTPATIENT)
Dept: PHYSICAL THERAPY | Age: 43
Setting detail: RECURRING SERIES
Discharge: HOME | End: 2023-08-19
Attending: ORTHOPAEDIC SURGERY
Payer: COMMERCIAL

## 2023-08-16 PROCEDURE — 97140 MANUAL THERAPY 1/> REGIONS: CPT

## 2023-08-16 PROCEDURE — 97110 THERAPEUTIC EXERCISES: CPT

## 2023-08-16 ASSESSMENT — PAIN SCALES - GENERAL: PAINLEVEL_OUTOF10: 2

## 2023-08-16 NOTE — PROGRESS NOTES
PT Visit [facility]  MD Guidelines  Scanned Media  Benefits  MyChart    Future Appointments   Date Time Provider [address]   8/22/2023 11:30 AM Angel Hoffmann PT [facility]   8/24/2023 11:30 AM Angel Hoffmann PT [facility]   9/6/2023  2:10 PM RAHEL Gomes MD [facility]   9/13/2023 10:00 AM Krysten Lopez MD [facility]

## 2023-08-22 ENCOUNTER — HOSPITAL ENCOUNTER (OUTPATIENT)
Dept: PHYSICAL THERAPY | Age: 43
Setting detail: RECURRING SERIES
Discharge: HOME | End: 2023-08-25
Attending: ORTHOPAEDIC SURGERY
Payer: COMMERCIAL

## 2023-08-22 PROCEDURE — 97110 THERAPEUTIC EXERCISES: CPT

## 2023-08-22 PROCEDURE — 97016 VASOPNEUMATIC DEVICE THERAPY: CPT

## 2023-08-22 PROCEDURE — 97140 MANUAL THERAPY 1/> REGIONS: CPT

## 2023-08-22 NOTE — PROGRESS NOTES
Zenaida Bazan  : 1980  Primary: Naila Robbins ([employer]INT[employer])  Secondary:  [address] [facility]  [address] [address]  Phone: [phone]  Fax: [phone] Plan Frequency: twice a week for 12 weeks    Plan of Care/Certification Expiration Date: 23      PT Visit Info:  Plan Frequency: twice a week for 12 weeks  Plan of Care/Certification Expiration Date: 23  Progress Note Counter: 5      Visit Count:  5    OUTPATIENT PHYSICAL THERAPY:OP NOTE TYPE: Treatment Note 2023       Episode  }Appt Desk             Treatment Diagnosis:  Pain in left shoulder (M25.512)   Stiffness of left shoulder, not elsewhere classified (M25.612)    Medical/Referring Diagnosis:  Left shoulder pain, unspecified chronicity [M25.512]  Bursitis of left shoulder [M75.52]  Shoulder injury related to vaccine administration (SIRVA) [S49.80XA, T50.Z95A]  Impingement syndrome of left shoulder [M75.42]  Referring Physician:  Renate Lesches, MD MD Orders:  PT Eval and Treat   Date of Onset:  Onset Date: 23     Allergies:   Ciprofloxacin, Amoxicillin, and Penicillins  Restrictions/Precautions:  Restrictions/Precautions: Other (comment) (must wear sling at all times for 4 weeks, then 2 weeks outside of the house; states she cannot pick any weighted item up with her L UE)  No data recorded      Subjective Comments:  Pt reports that she was told she can take the sling off at week 4 inside her house but is not sure she wants to (she is afraid she might accidentally use her L UE). She reports having less difficulty with getting onto the UBE today (states she has not been at work today so this could be contributing factor).   Initial:}     (no pain stated)/10Post Session:        (no pain number reported)/10  Medications Last Reviewed:  2023  Updated Objective Findings:  None Today  Treatment   Therapeutic Exercise: ( 8 minutes):  Exercises per grid below to improve mobility,

## 2023-08-24 ENCOUNTER — HOSPITAL ENCOUNTER (OUTPATIENT)
Dept: PHYSICAL THERAPY | Age: 43
Setting detail: RECURRING SERIES
Discharge: HOME | End: 2023-08-27
Attending: ORTHOPAEDIC SURGERY
Payer: COMMERCIAL

## 2023-08-24 PROCEDURE — 97110 THERAPEUTIC EXERCISES: CPT

## 2023-08-24 PROCEDURE — 97140 MANUAL THERAPY 1/> REGIONS: CPT

## 2023-08-24 ASSESSMENT — PAIN SCALES - GENERAL: PAINLEVEL_OUTOF10: 0

## 2023-08-24 NOTE — PROGRESS NOTES
mobility.     Total Treatment Billable Duration:  39 minutes  Time In: 3036  Time Out: 1210    Leanora Began, PT       Charge Capture  }Post Session Pain  PT Visit Info  [facility] Portal  MD Guidelines  Scanned Media  Benefits  MyChart    Future Appointments   Date Time Provider [address]   8/30/2023 11:30 AM Leanora Began, PT [facility]   9/1/2023 11:30 AM Leanora Began, PT [facility]   9/5/2023 11:30 AM Leanora Began, PT [facility]   9/6/2023  2:10 PM RAHEL Blood MD [facility]   9/7/2023 11:30 AM Leanora Began, PT [facility]   9/12/2023 11:30 AM Leanora Began, PT [facility]   9/13/2023 10:00 AM Salome Romeo MD [facility]   9/14/2023 11:30 AM Leanora Began, PT [facility]

## 2023-08-30 ENCOUNTER — HOSPITAL ENCOUNTER (OUTPATIENT)
Dept: PHYSICAL THERAPY | Age: 43
Setting detail: RECURRING SERIES
Discharge: HOME | End: 2023-09-02
Attending: ORTHOPAEDIC SURGERY
Payer: COMMERCIAL

## 2023-08-30 PROCEDURE — 97140 MANUAL THERAPY 1/> REGIONS: CPT

## 2023-08-30 PROCEDURE — 97110 THERAPEUTIC EXERCISES: CPT

## 2023-08-30 NOTE — PROGRESS NOTES
demonstrate improved mobility into L shoulder ER this session after initial muscle guarding, but continues to have difficulty with increasing range into flexion/abduction due to continued pain and stiffness. Communication/Consultation:  None today  Equipment provided today:  None  Recommendations/Intent for next treatment session: Next visit will focus on manual therapy/modalities as needed to improve ROM and reduce pain; follow biceps tenodesis protocol; work on L shoulder mobility.     Total Treatment Billable Duration:  40 minutes  Time In: 9356  Time Out: 19 Nori Elizondo, PT       Charge Capture  }Post Session Pain  PT Visit Info  [facility] [employer] Portal  MD Guidelines  Scanned Media  Benefits  MyChart    Future Appointments   Date Time Provider [address]   9/1/2023 11:30 AM Suzanne Bowles, PT [facility]   9/5/2023 11:30 AM Suzanne Bowles, PT [facility]   9/6/2023  2:10 PM RAHEL London MD [facility]   9/7/2023 11:30 AM Suzanne Bowles, PT [facility]   9/12/2023 11:30 AM Suzanne Bowles, PT [facility]   9/13/2023 10:00 AM Blaine Pérez MD [facility]   9/14/2023 11:30 AM Suzanne Bowles, PT [facility]

## 2023-09-01 ENCOUNTER — HOSPITAL ENCOUNTER (OUTPATIENT)
Dept: PHYSICAL THERAPY | Age: 43
Setting detail: RECURRING SERIES
Discharge: HOME | End: 2023-09-04
Attending: ORTHOPAEDIC SURGERY
Payer: COMMERCIAL

## 2023-09-01 PROCEDURE — 97140 MANUAL THERAPY 1/> REGIONS: CPT

## 2023-09-01 PROCEDURE — 97110 THERAPEUTIC EXERCISES: CPT

## 2023-09-01 NOTE — PROGRESS NOTES
wine per week    Drug use: Never    Sexual activity: Yes     Partners: Male   Other Topics Concern    Not on file   Social History Narrative    Not on file     Social Determinants of Health     Financial Resource Strain: Not on file   Food Insecurity: Not on file   Transportation Needs: Not on file   Physical Activity: Not on file   Stress: Not on file   Social Connections: Not on file   Intimate Partner Violence: Not on file   Housing Stability: Not on file        No flowsheet data found. Review of Systems  Noncontributory     PE: The wounds are clean, dry and intact, with no sign of infection. The skin is warm to the touch and they are neurovascularly intact. Passive Motion in Forward Elevation is 100. Passive External Rotation is 10-15. Passive Abduction is 85. She is a little apprehensive with passive range of motion. A/P:    ICD-10-CM    1. Left shoulder pain, unspecified chronicity  M25.512       2. Surgery follow-up  Z09         Continue with physical therapy per protocol. This includes weaning out of the sling. If they have questions or concerns in the interim they know they can call the office. Patient prescribed with Non-steroidal anti-inflammatories after review of risks and benefits. We discussed additional activity modification to help reduce pain for initial conservative treatment. I want to make sure she is not developing a frozen shoulder as she was becoming a little stiffer than I would like. Since most of her surgery addressed biceps placement outside of the joint her range of motion should not really be this limited so we will really need to continue to progress with passive stretching. Return in about 6 weeks (around 10/18/2023).      Elena Pizarro MD  09/06/23

## 2023-09-01 NOTE — PROGRESS NOTES
Ny Villalobos  : 1980  Primary: Merle Houstonbecky Robbins ([facility])  Secondary:  [address] [place] [address]  Phone: [phone]  Fax: [phone] Plan Frequency: twice a week for 12 weeks    Plan of Care/Certification Expiration Date: 23      PT Visit Info:  Plan Frequency: twice a week for 12 weeks  Plan of Care/Certification Expiration Date: 23  Progress Note Counter: 8      Visit Count:  8    OUTPATIENT PHYSICAL THERAPY:OP NOTE TYPE: Treatment Note 2023       Episode  }Appt Desk             Treatment Diagnosis:  Pain in left shoulder (M25.512)   Stiffness of left shoulder, not elsewhere classified (M25.612)    Medical/Referring Diagnosis:  Left shoulder pain, unspecified chronicity [M25.512]  Bursitis of left shoulder [M75.52]  Shoulder injury related to vaccine administration (SIRVA) [S49.80XA, T50.Z95A]  Impingement syndrome of left shoulder [M75.42]  Referring Physician:  Jazmín Davidson MD MD Orders:  PT Eval and Treat   Date of Onset:  Onset Date: 23     Allergies:   Ciprofloxacin, Amoxicillin, and Penicillins  Restrictions/Precautions:  Restrictions/Precautions: Other (comment) (must wear sling at all times for 4 weeks, then 2 weeks outside of the house; states she cannot pick any weighted item up with her L UE)  No data recorded      Subjective Comments:  Pt states she is having more pain today. States she iced before coming to session. Initial:}     (no specific pain number reported)/10Post Session:        (no pain verbalized)/10  Medications Last Reviewed:  2023  Updated Objective Findings:  None Today  Treatment   Therapeutic Exercise: ( 12 minutes):  Exercises per grid below to improve mobility, strength, and dynamic movement of left shoulder to improve functional lifting, carrying, reaching, and overhead activites.   Required minimal visual, verbal, and tactile cues to promote proper body alignment, promote

## 2023-09-05 ENCOUNTER — HOSPITAL ENCOUNTER (OUTPATIENT)
Dept: PHYSICAL THERAPY | Age: 43
Setting detail: RECURRING SERIES
Discharge: HOME | End: 2023-09-08
Attending: ORTHOPAEDIC SURGERY
Payer: COMMERCIAL

## 2023-09-05 PROCEDURE — 97140 MANUAL THERAPY 1/> REGIONS: CPT

## 2023-09-05 PROCEDURE — 97110 THERAPEUTIC EXERCISES: CPT

## 2023-09-05 ASSESSMENT — PAIN SCALES - GENERAL: PAINLEVEL_OUTOF10: 3

## 2023-09-05 NOTE — PROGRESS NOTES
Baldev Soria  : 1980  Primary: Luiz Ogden Sc ([place] [employer])  Secondary:  [address] [facility] [address]  [facility] [address]  Phone: [phone]  Fax: [phone] Plan Frequency: twice a week for 12 weeks    Plan of Care/Certification Expiration Date: 23      PT Visit Info:  Plan Frequency: twice a week for 12 weeks  Plan of Care/Certification Expiration Date: 23  Progress Note Counter: 9      Visit Count:  9    OUTPATIENT PHYSICAL THERAPY:OP NOTE TYPE: Treatment Note 2023       Episode  }Appt Desk             Treatment Diagnosis:  Pain in left shoulder (M25.512)   Stiffness of left shoulder, not elsewhere classified (M25.612)    Medical/Referring Diagnosis:  Left shoulder pain, unspecified chronicity [M25.512]  Bursitis of left shoulder [M75.52]  Shoulder injury related to vaccine administration (SIRVA) [S49.80XA, T50.Z95A]  Impingement syndrome of left shoulder [M75.42]  Referring Physician:  Tisha Iqbal MD MD Orders:  PT Eval and Treat   Date of Onset:  Onset Date: 23     Allergies:   Ciprofloxacin, Amoxicillin, and Penicillins  Restrictions/Precautions:  Restrictions/Precautions: Other (comment) (must wear sling at all times for 4 weeks, then 2 weeks outside of the house; states she cannot pick any weighted item up with her L UE)  No data recorded      Subjective Comments:  Pt states she moved out of her apartment the past weekend. Initial:}    3/10Post Session:        (no pain reported)/10  Medications Last Reviewed:  2023  Updated Objective Findings:  See evaluation note from today  Treatment   Therapeutic Exercise: ( 15 minutes):  Exercises per grid below (and assessment in chart on 2023)  to improve mobility, strength, and dynamic movement of left shoulder to improve functional lifting, carrying, reaching, and overhead activites.   Required minimal visual, verbal, and tactile cues to promote proper body alignment, promote

## 2023-09-06 ENCOUNTER — OFFICE VISIT (OUTPATIENT)
Dept: ORTHOPEDIC SURGERY | Age: 43
End: 2023-09-06

## 2023-09-06 DIAGNOSIS — Z09 SURGERY FOLLOW-UP: ICD-10-CM

## 2023-09-06 DIAGNOSIS — M25.512 LEFT SHOULDER PAIN, UNSPECIFIED CHRONICITY: Primary | ICD-10-CM

## 2023-09-06 PROCEDURE — 99024 POSTOP FOLLOW-UP VISIT: CPT | Performed by: ORTHOPAEDIC SURGERY

## 2023-09-06 RX ORDER — MELOXICAM 7.5 MG/1
7.5 TABLET ORAL 2 TIMES DAILY PRN
Qty: 42 TABLET | Refills: 0 | Status: SHIPPED | OUTPATIENT
Start: 2023-09-06 | End: 2023-09-27

## 2023-09-07 ENCOUNTER — HOSPITAL ENCOUNTER (OUTPATIENT)
Dept: PHYSICAL THERAPY | Age: 43
Setting detail: RECURRING SERIES
Discharge: HOME | End: 2023-09-10
Attending: ORTHOPAEDIC SURGERY
Payer: COMMERCIAL

## 2023-09-07 PROCEDURE — 97110 THERAPEUTIC EXERCISES: CPT

## 2023-09-07 PROCEDURE — 97140 MANUAL THERAPY 1/> REGIONS: CPT

## 2023-09-07 ASSESSMENT — ENCOUNTER SYMPTOMS
SHORTNESS OF BREATH: 0
VOMITING: 0
ABDOMINAL DISTENTION: 0
TROUBLE SWALLOWING: 0
SCLERAL ICTERUS: 0
BLOOD IN STOOL: 0
ABDOMINAL PAIN: 0
VOICE CHANGE: 0
HEMOPTYSIS: 0
DIARRHEA: 0
CONSTIPATION: 0
SORE THROAT: 0
NAUSEA: 0
CHEST TIGHTNESS: 0
WHEEZING: 0

## 2023-09-07 NOTE — PROGRESS NOTES
Kanwal Pat  : 1980  Primary: Salazar Robbins ([place] [employer])  Secondary:  Jazmin Bowling  [place] [address] [place] [address]  Phone: [phone]  Fax: [phone] Plan Frequency: twice a week for 12 weeks    Plan of Care/Certification Expiration Date: 23      PT Visit Info:  Plan Frequency: twice a week for 12 weeks  Plan of Care/Certification Expiration Date: 23  Progress Note Counter: 1      Visit Count:  10    OUTPATIENT PHYSICAL THERAPY:OP NOTE TYPE: Treatment Note 2023       Episode  }Appt Desk             Treatment Diagnosis:  Pain in left shoulder (M25.512)   Stiffness of left shoulder, not elsewhere classified (M25.612)    Medical/Referring Diagnosis:  Left shoulder pain, unspecified chronicity [M25.512]  Bursitis of left shoulder [M75.52]  Shoulder injury related to vaccine administration (SIRVA) [S49.80XA, T50.Z95A]  Impingement syndrome of left shoulder [M75.42]  Referring Physician:  Moriah Nassar MD MD Orders:  PT Eval and Treat   Date of Onset:  Onset Date: 23     Allergies:   Ciprofloxacin, Amoxicillin, and Penicillins  Restrictions/Precautions:  Restrictions/Precautions: Other (comment) (must wear sling at all times for 4 weeks, then 2 weeks outside of the house; states she cannot pick any weighted item up with her L UE)  No data recorded      Subjective Comments:  Pt states she saw the MD yesterday and he told her she doesn't have to be quite so cautious - prescribed her anti-inflammatory. Initial:}     (2-3)/10Post Session:        (2-3)/10  Medications Last Reviewed:  2023  Updated Objective Findings:  None Today  Treatment   Therapeutic Exercise: ( 14 minutes):  Exercises per grid below to improve mobility, strength, and dynamic movement of left shoulder to improve functional lifting, carrying, reaching, and overhead activites.   Required minimal visual, verbal, and tactile cues to promote proper body alignment, promote

## 2023-09-07 NOTE — PROGRESS NOTES
[facility] Hematology and Oncology: Established patient - follow up     Chief Complaint   Patient presents with    Follow-up     Reason for Referral: Atypical hyperplasia of left breast  Referring Provider: Johana Lentz DO  Primary Care Provider: Todd Trejo MD  Family History of Cancer/Hematologic Disorders: Family history is significant for mother with breast cancer diagnosed at 43years old and unspecified family history of colon cancer. Presenting Symptoms: Bloody left nipple discharge    History of Present Illness:  Ms. Maureen Howard is a 38 y.o. female who presents today for follow up regarding atypical hyperplasia of left breast.  She initially presented for a diagnostic bilateral mammogram and left breast ultrasound on 6/21/22 after noticing one week of spontaneous bloody left nipple discharge. There were no imaging findings suspicious for malignancy. Follow-up evaluation with a breast MRI was recommended because of the dense breast tissue, family history of breast cancer, and pathologic nipple discharge. MRI of the bilateral breasts was completed on 6/28/22 with no evidence of malignancy in either breast and no significant ductal distention. Radiologist suggested considering surgical referral if bloody nipple discharge persisted. Patient was referred to Surgery and evaluated in consultation by Surgeon, Dr. Noemi eD Luna, on 8/4/22 with persistent bloody left nipple discharge. Patient ultimately decided to undergo left breast duct excision, and she was taken to the OR on 8/24/22. Final pathology from the left breast duct revealed fibrocystic mastopathy having duct ectasia in association with moderate intraductal hyperplasia. Ms. Maureen Howard is recovering well postoperatively and is now referred to [facility] for oncology evaluation and possible endocrine therapy. At consultation, we discussed the pathophysiology of breast cancer, and pre-cancerous pathology using visual aides.   We then reviewed her medical

## 2023-09-12 ENCOUNTER — HOSPITAL ENCOUNTER (OUTPATIENT)
Dept: PHYSICAL THERAPY | Age: 43
Setting detail: RECURRING SERIES
Discharge: HOME | End: 2023-09-15
Attending: ORTHOPAEDIC SURGERY
Payer: COMMERCIAL

## 2023-09-12 ENCOUNTER — TELEPHONE (OUTPATIENT)
Dept: RADIATION ONCOLOGY | Age: 43
End: 2023-09-12

## 2023-09-12 PROCEDURE — 97110 THERAPEUTIC EXERCISES: CPT

## 2023-09-12 PROCEDURE — 97140 MANUAL THERAPY 1/> REGIONS: CPT

## 2023-09-12 ASSESSMENT — PAIN SCALES - GENERAL: PAINLEVEL_OUTOF10: 4

## 2023-09-12 NOTE — PROGRESS NOTES
Livier Kay  : 1980  Primary: Johny Robbins ([place] [employer])  Secondary:  [facility]  [address]  Phone: [phone]  Fax: [phone] Plan Frequency: twice a week for 12 weeks    Plan of Care/Certification Expiration Date: 23      PT Visit Info:  Plan Frequency: twice a week for 12 weeks  Plan of Care/Certification Expiration Date: 23  Progress Note Counter: 2      Visit Count:  11    OUTPATIENT PHYSICAL THERAPY:OP NOTE TYPE: Treatment Note 2023       Episode  }Appt Desk             Treatment Diagnosis:  Pain in left shoulder (M25.512)   Stiffness of left shoulder, not elsewhere classified (M25.612)    Medical/Referring Diagnosis:  Left shoulder pain, unspecified chronicity [M25.512]  Bursitis of left shoulder [M75.52]  Shoulder injury related to vaccine administration (SIRVA) [S49.80XA, T50.Z95A]  Impingement syndrome of left shoulder [M75.42]  Referring Physician:  Luanne Duke MD MD Orders:  PT Eval and Treat   Date of Onset:  Onset Date: 23     Allergies:   Ciprofloxacin, Amoxicillin, and Penicillins  Restrictions/Precautions:  Restrictions/Precautions: Other (comment) (must wear sling at all times for 4 weeks, then 2 weeks outside of the house; states she cannot pick any weighted item up with her L UE)  No data recorded      Subjective Comments:  Patient states she has been trying not to use the sling to sleep but is having pain during night that is waking her up. Initial:}    4 (4-5)/10Post Session:       4 (4-5)/10  Medications Last Reviewed:  2023  Updated Objective Findings:  None Today  Treatment   Therapeutic Exercise: ( 15 minutes):  Exercises per grid below to improve mobility, strength, and dynamic movement of left shoulder to improve functional lifting, carrying, reaching, and overhead activites.   Required minimal visual, verbal, and tactile cues to promote proper body alignment, promote

## 2023-09-12 NOTE — TELEPHONE ENCOUNTER
Chart reviewed. Unsuccessful attempt to contact patient. LVM informing patient that per Dr. Jackeline Hernandez team, please keep appointment. Also, given number for patient to call and schedule MRI already ordered. Instructed to call clinic for any other questions.

## 2023-09-13 ENCOUNTER — OFFICE VISIT (OUTPATIENT)
Dept: ONCOLOGY | Age: 43
End: 2023-09-13
Payer: COMMERCIAL

## 2023-09-13 ENCOUNTER — HOSPITAL ENCOUNTER (OUTPATIENT)
Dept: LAB | Age: 43
Discharge: HOME | End: 2023-09-16
Payer: COMMERCIAL

## 2023-09-13 VITALS
RESPIRATION RATE: 16 BRPM | SYSTOLIC BLOOD PRESSURE: 114 MMHG | DIASTOLIC BLOOD PRESSURE: 68 MMHG | BODY MASS INDEX: 22.13 KG/M2 | WEIGHT: 146 LBS | TEMPERATURE: 98.3 F | OXYGEN SATURATION: 96 % | HEIGHT: 68 IN | HEART RATE: 70 BPM

## 2023-09-13 DIAGNOSIS — T50.Z95A SHOULDER INJURY RELATED TO VACCINE ADMINISTRATION (SIRVA): ICD-10-CM

## 2023-09-13 DIAGNOSIS — N60.99 BENIGN MAMMARY DYSPLASIA, UNSPECIFIED LATERALITY: ICD-10-CM

## 2023-09-13 DIAGNOSIS — Z12.39 BREAST CANCER SCREENING, HIGH RISK PATIENT: ICD-10-CM

## 2023-09-13 DIAGNOSIS — Z75.8 DOES NOT HAVE PRIMARY CARE PROVIDER: ICD-10-CM

## 2023-09-13 DIAGNOSIS — Z12.39 BREAST CANCER SCREENING, HIGH RISK PATIENT: Primary | ICD-10-CM

## 2023-09-13 DIAGNOSIS — S49.80XA SHOULDER INJURY RELATED TO VACCINE ADMINISTRATION (SIRVA): ICD-10-CM

## 2023-09-13 LAB
ALBUMIN SERPL-MCNC: 4 G/DL (ref 3.5–5)
ALBUMIN/GLOB SERPL: 1.2 (ref 0.4–1.6)
ALP SERPL-CCNC: 70 U/L (ref 50–136)
ALT SERPL-CCNC: 56 U/L (ref 12–65)
ANION GAP SERPL CALC-SCNC: 5 MMOL/L (ref 2–11)
AST SERPL-CCNC: 21 U/L (ref 15–37)
BASOPHILS # BLD: 0 K/UL (ref 0–0.2)
BASOPHILS NFR BLD: 1 % (ref 0–2)
BILIRUB SERPL-MCNC: 0.4 MG/DL (ref 0.2–1.1)
BUN SERPL-MCNC: 7 MG/DL (ref 6–23)
CALCIUM SERPL-MCNC: 9.7 MG/DL (ref 8.3–10.4)
CHLORIDE SERPL-SCNC: 108 MMOL/L (ref 101–110)
CO2 SERPL-SCNC: 26 MMOL/L (ref 21–32)
CREAT SERPL-MCNC: 0.9 MG/DL (ref 0.6–1)
DIFFERENTIAL METHOD BLD: NORMAL
EOSINOPHIL # BLD: 0.1 K/UL (ref 0–0.8)
EOSINOPHIL NFR BLD: 1 % (ref 0.5–7.8)
ERYTHROCYTE [DISTWIDTH] IN BLOOD BY AUTOMATED COUNT: 12.4 % (ref 11.9–14.6)
GLOBULIN SER CALC-MCNC: 3.4 G/DL (ref 2.8–4.5)
GLUCOSE SERPL-MCNC: 97 MG/DL (ref 65–100)
HCT VFR BLD AUTO: 42.9 % (ref 35.8–46.3)
HGB BLD-MCNC: 14.3 G/DL (ref 11.7–15.4)
IMM GRANULOCYTES # BLD AUTO: 0 K/UL (ref 0–0.5)
IMM GRANULOCYTES NFR BLD AUTO: 0 % (ref 0–5)
LYMPHOCYTES # BLD: 2.4 K/UL (ref 0.5–4.6)
LYMPHOCYTES NFR BLD: 44 % (ref 13–44)
MCH RBC QN AUTO: 31.2 PG (ref 26.1–32.9)
MCHC RBC AUTO-ENTMCNC: 33.3 G/DL (ref 31.4–35)
MCV RBC AUTO: 93.5 FL (ref 82–102)
MONOCYTES # BLD: 0.4 K/UL (ref 0.1–1.3)
MONOCYTES NFR BLD: 7 % (ref 4–12)
NEUTS SEG # BLD: 2.5 K/UL (ref 1.7–8.2)
NEUTS SEG NFR BLD: 47 % (ref 43–78)
NRBC # BLD: 0 K/UL (ref 0–0.2)
PLATELET # BLD AUTO: 250 K/UL (ref 150–450)
PMV BLD AUTO: 9.9 FL (ref 9.4–12.3)
POTASSIUM SERPL-SCNC: 4.1 MMOL/L (ref 3.5–5.1)
PROT SERPL-MCNC: 7.4 G/DL (ref 6.3–8.2)
RBC # BLD AUTO: 4.59 M/UL (ref 4.05–5.2)
SODIUM SERPL-SCNC: 139 MMOL/L (ref 133–143)
WBC # BLD AUTO: 5.4 K/UL (ref 4.3–11.1)

## 2023-09-13 PROCEDURE — 80053 COMPREHEN METABOLIC PANEL: CPT

## 2023-09-13 PROCEDURE — 85025 COMPLETE CBC W/AUTO DIFF WBC: CPT

## 2023-09-13 PROCEDURE — 99215 OFFICE O/P EST HI 40 MIN: CPT | Performed by: INTERNAL MEDICINE

## 2023-09-13 PROCEDURE — 36415 COLL VENOUS BLD VENIPUNCTURE: CPT

## 2023-09-13 ASSESSMENT — PATIENT HEALTH QUESTIONNAIRE - PHQ9
6. FEELING BAD ABOUT YOURSELF - OR THAT YOU ARE A FAILURE OR HAVE LET YOURSELF OR YOUR FAMILY DOWN: 0
SUM OF ALL RESPONSES TO PHQ QUESTIONS 1-9: 4
7. TROUBLE CONCENTRATING ON THINGS, SUCH AS READING THE NEWSPAPER OR WATCHING TELEVISION: 0
SUM OF ALL RESPONSES TO PHQ9 QUESTIONS 1 & 2: 0
5. POOR APPETITE OR OVEREATING: 1
SUM OF ALL RESPONSES TO PHQ QUESTIONS 1-9: 4
2. FEELING DOWN, DEPRESSED OR HOPELESS: 0
10. IF YOU CHECKED OFF ANY PROBLEMS, HOW DIFFICULT HAVE THESE PROBLEMS MADE IT FOR YOU TO DO YOUR WORK, TAKE CARE OF THINGS AT HOME, OR GET ALONG WITH OTHER PEOPLE: 0
8. MOVING OR SPEAKING SO SLOWLY THAT OTHER PEOPLE COULD HAVE NOTICED. OR THE OPPOSITE, BEING SO FIGETY OR RESTLESS THAT YOU HAVE BEEN MOVING AROUND A LOT MORE THAN USUAL: 0
SUM OF ALL RESPONSES TO PHQ QUESTIONS 1-9: 4
4. FEELING TIRED OR HAVING LITTLE ENERGY: 1
9. THOUGHTS THAT YOU WOULD BE BETTER OFF DEAD, OR OF HURTING YOURSELF: 0
3. TROUBLE FALLING OR STAYING ASLEEP: 2
SUM OF ALL RESPONSES TO PHQ QUESTIONS 1-9: 4
1. LITTLE INTEREST OR PLEASURE IN DOING THINGS: 0

## 2023-09-13 ASSESSMENT — ANXIETY QUESTIONNAIRES
6. BECOMING EASILY ANNOYED OR IRRITABLE: 0
4. TROUBLE RELAXING: 0
GAD7 TOTAL SCORE: 0
2. NOT BEING ABLE TO STOP OR CONTROL WORRYING: 0
3. WORRYING TOO MUCH ABOUT DIFFERENT THINGS: 0
IF YOU CHECKED OFF ANY PROBLEMS ON THIS QUESTIONNAIRE, HOW DIFFICULT HAVE THESE PROBLEMS MADE IT FOR YOU TO DO YOUR WORK, TAKE CARE OF THINGS AT HOME, OR GET ALONG WITH OTHER PEOPLE: NOT DIFFICULT AT ALL
7. FEELING AFRAID AS IF SOMETHING AWFUL MIGHT HAPPEN: 0
5. BEING SO RESTLESS THAT IT IS HARD TO SIT STILL: 0
1. FEELING NERVOUS, ANXIOUS, OR ON EDGE: 0

## 2023-09-13 NOTE — PATIENT INSTRUCTIONS
Patient Instructions from Today's Visit    Reason for Visit:  Follow Up    Plan:  Symptoms reviewed. Recommend mammogram annually and MRI annually, alternating every 6 months. Mammogram due now. You can call to schedule this at [phone]. Referral to Dr. Bernabe Morris for primary care. We will do blood work today. Follow Up:  6-8 months. Recent Lab Results:  N/A    Treatment Summary has been discussed and given to patient: N/A        -------------------------------------------------------------------------------------------------------------------  Please call our office at [phone] if you have any  of the following symptoms:   Fever of 100.5 or greater  Chills  Shortness of breath  Swelling or pain in one leg    After office hours an answering service is available and will contact a provider for emergencies or if you are experiencing any of the above symptoms. Patient did express an interest in My Chart. My Chart log in information explained on the after visit summary printout at the [address] desk.     Kingsley Mcdonald RN

## 2023-09-14 ENCOUNTER — HOSPITAL ENCOUNTER (OUTPATIENT)
Dept: PHYSICAL THERAPY | Age: 43
Setting detail: RECURRING SERIES
Discharge: HOME | End: 2023-09-17
Attending: ORTHOPAEDIC SURGERY
Payer: COMMERCIAL

## 2023-09-14 PROCEDURE — 97110 THERAPEUTIC EXERCISES: CPT

## 2023-09-14 PROCEDURE — 97140 MANUAL THERAPY 1/> REGIONS: CPT

## 2023-09-14 NOTE — PROGRESS NOTES
Self-mobilization into inferior L glenohumeral glide Instructed pt on how to perform at home                                      *given in HEP  [facility] Portal   Pt given following band colors: [] Yellow          [] Red          [] Green          [] Blue          [] Grey    Manual Therapy: (25 minutes)   With pt in supported supine position:   - passive ROM to L shoulder (with L elbow flexed) into flexion, abduction, and ER at 15 and 45 degrees abduction to maximize L shoulder mobility and reduce pain    - gentle inferior distraction and posterior mobilizations at L glenohumeral joint (grade 2-3, occasional thrusts) to improve mobility and reduce muscle guarding and pain   - STM to musculature surrounding L shoulder to reduce pain and muscle guarding    Modalities: () Pt declined due to having to return to work immediately after session    Treatment/Session Summary:    Treatment Assessment:  Pt demonstrating improved mobility into shoulder abduction and ER this session, but continues to have significant muscle guarding. She has not yet tried pulleys at home - encouraged her to try over the weekend. Communication/Consultation:  None today  Equipment provided today:  None  Recommendations/Intent for next treatment session: Next visit will focus on manual therapy/modalities as needed to improve ROM and reduce pain; follow biceps tenodesis protocol; work on L shoulder mobility.     Total Treatment Billable Duration:  40 minutes  Time In: 4403  Time Out: 1210    Bruce Cobb PT       Charge Capture  }Post Session Pain  PT Visit Info  [facility] Portal  MD Guidelines  Scanned Media  Benefits  MyChart    Future Appointments   Date Time Provider [address]   9/19/2023 11:30 AM Bruce Cobb PT [facility]   9/22/2023 11:30 AM SOLANGE Vann   9/26/2023 11:30 AM SOLANGE Vann   9/28/2023 11:30 AM Bruce Cobb PT [facility]   10/3/2023 11:30 AM Elmira Valdes PT

## 2023-09-19 ENCOUNTER — HOSPITAL ENCOUNTER (OUTPATIENT)
Dept: PHYSICAL THERAPY | Age: 43
Setting detail: RECURRING SERIES
Discharge: HOME | End: 2023-09-22
Attending: ORTHOPAEDIC SURGERY
Payer: COMMERCIAL

## 2023-09-19 PROCEDURE — 97110 THERAPEUTIC EXERCISES: CPT

## 2023-09-19 PROCEDURE — 97140 MANUAL THERAPY 1/> REGIONS: CPT

## 2023-09-19 ASSESSMENT — PAIN SCALES - GENERAL: PAINLEVEL_OUTOF10: 3

## 2023-09-19 NOTE — PROGRESS NOTES
Alba Roland  : 1980  [facility]  Secondary:  [facility] [place] [address] [place] [address]  Phone: [phone]  Fax: [phone] Plan Frequency: twice a week for 12 weeks    Plan of Care/Certification Expiration Date: 23      PT Visit Info:  Plan Frequency: twice a week for 12 weeks  Plan of Care/Certification Expiration Date: 23  Progress Note Counter: 5      Visit Count:  13    OUTPATIENT PHYSICAL THERAPY:OP NOTE TYPE: Treatment Note 2023       Episode  }Appt Desk             Treatment Diagnosis:  Pain in left shoulder (M25.512)   Stiffness of left shoulder, not elsewhere classified (M25.612)    Medical/Referring Diagnosis:  Left shoulder pain, unspecified chronicity [M25.512]  Bursitis of left shoulder [M75.52]  Shoulder injury related to vaccine administration (SIRVA) [S49.80XA, T50.Z95A]  Impingement syndrome of left shoulder [M75.42]  Referring Physician:  Mannie Keating MD MD Orders:  PT Eval and Treat   Date of Onset:  Onset Date: 23     Allergies:   Ciprofloxacin, Amoxicillin, and Penicillins  Restrictions/Precautions:  Restrictions/Precautions: -- (Pt now should have no lifting restrictions per biceps tenodesis protocol)  No data recorded      Subjective Comments:  Pt states she tried using voltaren gel last night and that helped her to sleep without waking up due to pain. Initial:}    3/10Post Session:       3 (3-4)/10  Medications Last Reviewed:  2023  Updated Objective Findings:  None Today  Treatment   Therapeutic Exercise: ( 25 minutes):  Exercises per grid below to improve mobility, strength, and dynamic movement of left shoulder to improve functional lifting, carrying, reaching, and overhead activites.   Required minimal visual, verbal, and tactile cues to promote proper body alignment, promote proper body posture, promote proper body mechanics, and promote proper body breathing

## 2023-09-22 ENCOUNTER — HOSPITAL ENCOUNTER (OUTPATIENT)
Dept: PHYSICAL THERAPY | Age: 43
Setting detail: RECURRING SERIES
Discharge: HOME | End: 2023-09-25
Attending: ORTHOPAEDIC SURGERY
Payer: COMMERCIAL

## 2023-09-22 PROCEDURE — 97140 MANUAL THERAPY 1/> REGIONS: CPT

## 2023-09-22 PROCEDURE — 97110 THERAPEUTIC EXERCISES: CPT

## 2023-09-22 ASSESSMENT — PAIN SCALES - GENERAL: PAINLEVEL_OUTOF10: 3

## 2023-09-22 NOTE — PROGRESS NOTES
Self-mobilization into posterior L glenohumeral glide  Modified plank at ledge - cues on how to perform on counter at home with progressively increased weightbearing through L shoulder       Finger ladder 10 reps/1 set L UE with cues to lean into stretch for 10-20 seconds May try next session       Pectoral/bicep stretch at doorway 30 second hold at 30 degrees shoulder abduction x 3 reps        Shoulder extension AAROM with wand 20 reps/1 set                          *given in HEP  [facility] Portal   Pt given following band colors: [] Yellow          [] Red          [] Green          [] Blue          [] Grey    Manual Therapy: (8 minutes)   With pt in supported supine position:   - passive ROM to L shoulder (with L elbow flexed) into flexion, abduction, and ER at 15 and 45 degrees abduction to maximize L shoulder mobility and reduce pain    - gentle inferior distraction and posterior mobilizations at L glenohumeral joint (grade 2-3) to improve mobility and reduce muscle guarding and pain  With pt in R sidelying position:   - L scapular mobilizations into upward rotation and protraction with STM to rhomboids/middle traps to restore normal movement and reduce pain Not today (9/22/2023)    Modalities: () Pt having to return to work    Treatment/Session Summary:    Treatment Assessment:  Pt demonstrating slightly improved L shoulder mobility this session but continues to have significant stiffness and pain at end-range, especially with L shoulder abduction. Encouraged pt to work on static stretching into L shoulder abduction and flexion at home to get more mobility. Communication/Consultation:  None today  Equipment provided today:  None  Recommendations/Intent for next treatment session: Next visit will focus on manual therapy/modalities as needed to improve ROM and reduce pain; follow biceps tenodesis protocol; work on L shoulder mobility.     Total Treatment Billable Duration:  40 minutes  Time In: 1130  Time Out:

## 2023-09-26 ENCOUNTER — HOSPITAL ENCOUNTER (OUTPATIENT)
Dept: PHYSICAL THERAPY | Age: 43
Setting detail: RECURRING SERIES
Discharge: HOME | End: 2023-09-29
Attending: ORTHOPAEDIC SURGERY
Payer: COMMERCIAL

## 2023-09-26 PROCEDURE — 97140 MANUAL THERAPY 1/> REGIONS: CPT

## 2023-09-26 PROCEDURE — 97110 THERAPEUTIC EXERCISES: CPT

## 2023-09-26 ASSESSMENT — PAIN SCALES - GENERAL: PAINLEVEL_OUTOF10: 3

## 2023-09-26 NOTE — PROGRESS NOTES
Analy Padilla  : 1980  [facility]  Secondary:  Zenaida Cornelius [place] [address]  Phone: [phone]  Fax: [phone] Plan Frequency: twice a week for 12 weeks    Plan of Care/Certification Expiration Date: 23      PT Visit Info:  Plan Frequency: twice a week for 12 weeks  Plan of Care/Certification Expiration Date: 23  Progress Note Counter: 7      Visit Count:  15    OUTPATIENT PHYSICAL THERAPY:OP NOTE TYPE: Treatment Note 2023       Episode  }Appt Desk             Treatment Diagnosis:  Pain in left shoulder (M25.512)   Stiffness of left shoulder, not elsewhere classified (M25.612)    Medical/Referring Diagnosis:  Left shoulder pain, unspecified chronicity [M25.512]  Bursitis of left shoulder [M75.52]  Shoulder injury related to vaccine administration (SIRVA) [S49.80XA, T50.Z95A]  Impingement syndrome of left shoulder [M75.42]  Referring Physician:  Mitzi Segura MD MD Orders:  PT Eval and Treat   Date of Onset:  Onset Date: 23     Allergies:   Ciprofloxacin, Amoxicillin, and Penicillins  Restrictions/Precautions:  Restrictions/Precautions: -- (Pt now should have no lifting restrictions per biceps tenodesis protocol)  No data recorded      Subjective Comments:  Pt states that Friday night she had no pain but Saturday/ night she had pain. States last night it was just [place]. States no longer constant pain, just [place] here and there\". Real pain is still coming from elbow and wrist.    Initial:}    3/10Post Session:        (no pain reported)/10  Medications Last Reviewed:  2023  Updated Objective Findings:  None Today  Treatment   Therapeutic Exercise: ( 27 minutes):  Exercises per grid below to improve mobility, strength, and dynamic movement of left shoulder to improve functional lifting, carrying, reaching, and overhead activites.   Required minimal visual, verbal, and tactile cues to

## 2023-09-28 ENCOUNTER — HOSPITAL ENCOUNTER (OUTPATIENT)
Dept: PHYSICAL THERAPY | Age: 43
Setting detail: RECURRING SERIES
End: 2023-09-28
Attending: ORTHOPAEDIC SURGERY
Payer: COMMERCIAL

## 2023-09-28 PROCEDURE — 97140 MANUAL THERAPY 1/> REGIONS: CPT

## 2023-09-28 PROCEDURE — 97110 THERAPEUTIC EXERCISES: CPT

## 2023-09-28 ASSESSMENT — PAIN SCALES - GENERAL: PAINLEVEL_OUTOF10: 4

## 2023-09-28 NOTE — PROGRESS NOTES
"Vasectomy Consult    Reason for visit:   Discuss as a method of birth control/sterilization.  He is interested in vasectomy scrotally.  Patient has 2 children and desires sterilization.  Does not want to use condom or having partners on birth control pills.  He has no erections problem.  He has no urinary complaints.    Current Outpatient Prescriptions   Medication Sig Dispense Refill     NO ACTIVE MEDICATIONS . 0 0     Allergies   Allergen Reactions     No Known Drug Allergies      Past Medical History:   Diagnosis Date     NO ACTIVE PROBLEMS (aka NONE)      Past Surgical History:   Procedure Laterality Date     NO HISTORY OF SURGERY        No family history on file.  Social History     Social History     Marital status: Single     Spouse name: N/A     Number of children: N/A     Years of education: N/A     Social History Main Topics     Smoking status: Former Smoker     Smokeless tobacco: Never Used     Alcohol use Yes      Comment: occ     Drug use: No     Sexual activity: Yes     Partners: Female     Birth control/ protection: Pill     Other Topics Concern     None     Social History Narrative       REVIEW OF SYSTEMS  =================  C: NEGATIVE for fever, chills, change in weight  I: NEGATIVE for worrisome rashes, moles or lesions  E/M: NEGATIVE for ear, mouth and throat problems  R: NEGATIVE for significant cough or SHORTNESS OF BREATH  CV:  NEGATIVE for chest pain, palpitations or peripheral edema  GI: NEGATIVE for nausea, abdominal pain, heartburn, or change in bowel habits  NEURO: NEGATIVE numbness/weakness  : see HPI  PSYCH: NEGATIVE depression/anxiety  LYmph: no new enlarged lymph nodes  Ortho: no new trauma/movements      Physical Exam:  /60 (BP Location: Right arm, Patient Position: Chair, Cuff Size: Adult Regular)  Ht 6' 1.82\" (1.875 m)  Wt 211 lb 8 oz (95.9 kg)  BMI 27.29 kg/m2   Patient is pleasant, in no acute distress, good general condition.  HEENT:  Normalcephalic, atraumatic  Lung: " Mike Colbert  : 1980  Primary: Mray Robbins ([facility] QUINTANILLA)  Secondary:  [facility] [place] [address] [address] [facility]  Phone: [phone]  Fax: [phone] Plan Frequency: twice a week for 12 weeks    Plan of Care/Certification Expiration Date: 23      PT Visit Info:  Plan Frequency: twice a week for 12 weeks  Plan of Care/Certification Expiration Date: 23  Progress Note Counter: 8      Visit Count:  16    OUTPATIENT PHYSICAL THERAPY:OP NOTE TYPE: Treatment Note 2023       Episode  }Appt Desk             Treatment Diagnosis:  Pain in left shoulder (M25.512)   Stiffness of left shoulder, not elsewhere classified (M25.612)    Medical/Referring Diagnosis:  Left shoulder pain, unspecified chronicity [M25.512]  Bursitis of left shoulder [M75.52]  Shoulder injury related to vaccine administration (SIRVA) [S49.80XA, T50.Z95A]  Impingement syndrome of left shoulder [M75.42]  Referring Physician:  Loki Mederos MD MD Orders:  PT Eval and Treat   Date of Onset:  Onset Date: 23     Allergies:   Ciprofloxacin, Amoxicillin, and Penicillins  Restrictions/Precautions:  Restrictions/Precautions: -- (Pt now should have no lifting restrictions per biceps tenodesis protocol)  No data recorded      Subjective Comments:  Pt states her shoulder has been more painful in the mornings the past few days and she has been waking up at 4 am. States her elbow did feel better after last session but has started hurting again. Initial:}    4/10Post Session:        (no change in pain stated)/10  Medications Last Reviewed:  2023  Updated Objective Findings:  None Today  Treatment   Therapeutic Exercise: ( 29 minutes):  Exercises per grid below to improve mobility, strength, and dynamic movement of left shoulder to improve functional lifting, carrying, reaching, and overhead activites.   Required minimal visual, verbal, and tactile cues to promote proper body no evidence of respiratory distress    Abdomen: Soft, nondistended, non tender. No masses. No rebound or guarding.   Exam: penis no d/c testis no masses bilateral vas palpated no scrotal lesion  Skin: Warm and dry.  No redness.  Neuro: grossly normal  Psych normal mood and affect  Musculoskeletal  moving all extremities        Discussed    That vasectomy is permanent method of birth control.    That vasectomy can fail due to recanalization of the vas even many months/years later.    That he needs 2 negative sperm checks to be considered sterile    That there are other methods that are not permanent and also that the sperm can be frozen for later use.    How the technique is performed, risks of infection, bleeding, damage to the testes vessels and testes atrophy    Long term complication such as chronic and difficult to treat testes pain and questionable increase incidence of prostate cancer    That the procedure can be done at the clinic or hospital OR        Plan:    Stop Aspirin  Will schedule Vasectomy in the future         restore normal movement and reduce pain Not today (9/26/2023)    Modalities: () Pt having to return to work    Treatment/Session Summary:    Treatment Assessment:  Pt demonstrating improved L shoulder mobility (especially into abduction) this session and less pain reported at elbow. Communication/Consultation:  None today  Equipment provided today:  None  Recommendations/Intent for next treatment session: Next visit will focus on manual therapy/modalities as needed to improve ROM and reduce pain; follow biceps tenodesis protocol; work on L shoulder mobility.     Total Treatment Billable Duration:  40 minutes  Time In: 1841  Time Out: 2463    Sulema Pals, PT       Charge Capture  }Post Session Pain  PT Visit Info  [employer] Portal  MD Guidelines  Scanned Media  Benefits  MyChart    Future Appointments   Date Time Provider [address]   10/3/2023 11:30 AM Sulema Pals, PT [facility]   10/5/2023  1:00 PM Sulema Pals, PT [facility]   10/11/2023 11:30 AM Sulema Pals, PT [facility]   10/13/2023 11:30 AM Sulema Pals, PT [facility]   10/18/2023  1:30 PM B Eam Amador MD [facility]   10/26/2023  2:00 PM Maryan Restrepo MD [facility]   5/13/2024  7:30 AM PERIPHERAL [facility]   5/13/2024  8:00 AM Milana Michaud MD [facility]

## 2023-10-03 ENCOUNTER — HOSPITAL ENCOUNTER (OUTPATIENT)
Dept: PHYSICAL THERAPY | Age: 43
Setting detail: RECURRING SERIES
Discharge: HOME | End: 2023-10-06
Attending: ORTHOPAEDIC SURGERY
Payer: COMMERCIAL

## 2023-10-03 PROCEDURE — 97140 MANUAL THERAPY 1/> REGIONS: CPT

## 2023-10-03 PROCEDURE — 97110 THERAPEUTIC EXERCISES: CPT

## 2023-10-03 ASSESSMENT — PAIN SCALES - GENERAL: PAINLEVEL_OUTOF10: 0

## 2023-10-03 NOTE — PROGRESS NOTES
Chelsea Lynn  : 1980  Primary: Sharifa Weber Sc ([facility] QUINTANILLA)  Secondary:  Nelida Ahumada [facility] [place] [address]  Phone: [phone]  Fax: [phone] Plan Frequency: twice a week for 12 weeks    Plan of Care/Certification Expiration Date: 23      PT Visit Info:  Plan Frequency: twice a week for 12 weeks  Plan of Care/Certification Expiration Date: 23  Total # of Visits to Date: 17  Progress Note Counter: 9      Visit Count:  17    OUTPATIENT PHYSICAL THERAPY:  OP NOTE TYPE: Treatment Note 10/3/2023       Episode  Appt Desk             Treatment Diagnosis:  Pain in left shoulder (M25.512)   Stiffness of left shoulder, not elsewhere classified (M25.612)      Medical/Referring Diagnosis:  Left shoulder pain, unspecified chronicity [M25.512]  Bursitis of left shoulder [M75.52]  Shoulder injury related to vaccine administration (SIRVA) [S49.80XA, T50.Z95A]  Impingement syndrome of left shoulder [M75.42]  Referring Physician:  Luz Fajardo MD MD Orders:  PT Eval and Treat   Date of Onset:  Onset Date: 23     Allergies:   Ciprofloxacin, Amoxicillin, and Penicillins  Restrictions/Precautions:  Restrictions/Precautions: -- (Pt now should have no lifting restrictions per biceps tenodesis protocol)  No data recorded     No overhead lifting of weights; limited to lifting 5# with the LUE  Subjective Comments: most of her pain is at night, she is having trouble sleeping due to this    Initial:    0/10  Post Session:       0/10    Medications Last Reviewed:  10/3/2023  Updated Objective Findings:  None Today  Treatment   Therapeutic Exercise: ( 35 minutes):  Exercises per grid below to improve mobility, strength, and dynamic movement of left shoulder to improve functional lifting, carrying, reaching, and overhead activites.   Required minimal visual, verbal, and tactile cues to promote proper body alignment, promote proper body posture, promote

## 2023-10-05 ENCOUNTER — HOSPITAL ENCOUNTER (OUTPATIENT)
Dept: PHYSICAL THERAPY | Age: 43
Setting detail: RECURRING SERIES
End: 2023-10-05
Attending: ORTHOPAEDIC SURGERY
Payer: COMMERCIAL

## 2023-10-11 ENCOUNTER — HOSPITAL ENCOUNTER (OUTPATIENT)
Dept: PHYSICAL THERAPY | Age: 43
Setting detail: RECURRING SERIES
Discharge: HOME | End: 2023-10-14
Attending: ORTHOPAEDIC SURGERY
Payer: COMMERCIAL

## 2023-10-11 PROCEDURE — 97110 THERAPEUTIC EXERCISES: CPT

## 2023-10-11 PROCEDURE — 97140 MANUAL THERAPY 1/> REGIONS: CPT

## 2023-10-11 NOTE — PROGRESS NOTES
Chelsiolayinkacurly Rojelio  : 1980  Primary: Malgorzata Robbins ([facility])  Secondary:  [facility] [place] [address]  Phone: [phone]  Fax: [phone] Plan Frequency: twice a week for 12 weeks    Plan of Care/Certification Expiration Date: 23      PT Visit Info:  Plan Frequency: twice a week for 12 weeks  Plan of Care/Certification Expiration Date: 23  Total # of Visits to Date: 18  Progress Note Counter: 10      Visit Count:  18    OUTPATIENT PHYSICAL THERAPY:  OP NOTE TYPE: Treatment Note 10/11/2023       Episode  Appt Desk             Treatment Diagnosis:  Pain in left shoulder (M25.512)   Stiffness of left shoulder, not elsewhere classified (M25.612)      Medical/Referring Diagnosis:  Left shoulder pain, unspecified chronicity [M25.512]  Bursitis of left shoulder [M75.52]  Shoulder injury related to vaccine administration (SIRVA) [S49.80XA, T50.Z95A]  Impingement syndrome of left shoulder [M75.42]  Referring Physician:  Diana Ramirez MD MD Orders:  PT Eval and Treat   Date of Onset:  Onset Date: 23     Allergies:   Ciprofloxacin, Amoxicillin, and Penicillins  Restrictions/Precautions:  Restrictions/Precautions: -- (Pt now should have no lifting restrictions per biceps tenodesis protocol)  No data recorded     No overhead lifting of weights; limited to lifting 5# with the LUE  Subjective Comments: Pt states she was woken up by the pain in her shoulder last night. She is wondering if more strengthening exercises will help her pain.     Initial:     (no specific number verbalized)/10  Post Session:        (no specific number verbalized)/10    Medications Last Reviewed:  10/11/2023  Updated Objective Findings:  See Evaluation Note from today  Treatment   Therapeutic Exercise: ( 34 minutes):  Exercises per grid below (and assessment in chart on 10/11/2023) to improve mobility, strength, and dynamic movement of left shoulder to improve
5/5 5/5 5/5        ASSESSMENT   REASSESSMENT:  Nikhil Nichols has now attended 18 physical therapy sessions for L shoulder pain/stiffness following L shoulder surgery on 8/3/2023 (details in history above). This session, pt demonstrated improved L shoulder mobility, less postural deficits, and improved functional mobility as evident by a score of 32/55 on the Disabilities of the Arm, Shoulder, and Hand Questionnaire (initial score of 43/55, with higher scores indicating increased disability). Despite these improvements, she continues to demonstrate decreased L UE strength/endurance, decreased left shoulder mobility with associated pain, multiple postural deficits, and decreased functional mobility. Pt still has some guarding at L shoulder, which has continued to make it more challenging to progress her ROM, but this has gotten better overall. Pt may continue to benefit from skilled PT to address the above listed deficits to improve ability to perform pain-free ADLs/IADLs and to improve overall quality of life prior to discharge. Problem List: (Impacting functional limitations):     Increased Pain, Decreased Strength, Decreased ROM, Decreased Functional Mobility, Decreased [place] with Home Exercise Program, Decreased Posture, Decreased Body Mechanics, Decreased Activity Tolerance/Endurance*, Decreased Tolerance to Work Activity, Decreased High-Level IADLs, and Edema/Girth     Therapy Prognosis:   Therapy Prognosis: Good       Initial Assessment Complexity:   Decision Making: Medium Complexity      PLAN   Effective Dates: 8/8/2023 TO Plan of Care/Certification Expiration Date: 11/06/23     Frequency/Duration: Plan Frequency: twice a week for 12 weeks     Interventions Planned (Treatment may consist of any combination of the following):    Endurance Training, Functional Mobility Training, Home Exercise Program (HEP), Manual Therapy, Neuromuscular Re-education/Strengthening, Pain Management, Positioning,

## 2023-10-13 ENCOUNTER — HOSPITAL ENCOUNTER (OUTPATIENT)
Dept: PHYSICAL THERAPY | Age: 43
Setting detail: RECURRING SERIES
Discharge: HOME | End: 2023-10-16
Attending: ORTHOPAEDIC SURGERY
Payer: COMMERCIAL

## 2023-10-13 PROBLEM — Z12.39 BREAST CANCER SCREENING, HIGH RISK PATIENT: Status: RESOLVED | Noted: 2023-09-13 | Resolved: 2023-10-13

## 2023-10-13 PROCEDURE — 97140 MANUAL THERAPY 1/> REGIONS: CPT

## 2023-10-13 PROCEDURE — 97110 THERAPEUTIC EXERCISES: CPT

## 2023-10-13 ASSESSMENT — PAIN SCALES - GENERAL: PAINLEVEL_OUTOF10: 4

## 2023-10-13 NOTE — PROGRESS NOTES
abduction with inferior glide (by adding weight of purse at glenohumeral head). She demonstrated slight improvement in shoulder abduction this session after manual therapy to acromioclavicular joint and mobilizations to humeral head. Communication/Consultation:  None today  Equipment provided today:  None  Recommendations/Intent for next treatment session: Next visit will focus on manual therapy/modalities as needed to improve ROM and reduce pain; follow biceps tenodesis protocol; work on L shoulder mobility.     Total Treatment Billable Duration:  40 minutes  Time In: 0599  Time Out: 6481    Edgar Santacruz, PT, DPT       Charge Capture  Post Session Pain  PT Visit Info  [employer] Portal  MD Guidelines  Scanned Media  Benefits  MyChart    Future Appointments   Date Time Provider [address]   10/17/2023  1:00 PM Edgar Santacruz PT [facility]   10/18/2023  1:30 PM Jacques Shelton MD [facility] AM[facility]   10/19/2023  8:00 AM Kemar MCGINNIS PT [facility]   10/23/2023 11:30 AM Jovon Flower PT [facility]   10/25/2023  1:45 PM Jovon Flower PT [facility]   11/28/2023  1:30 PM [facility]   5/13/2024  7:30 AM PERIPHERAL [facility]   5/13/2024  8:00 AM Jeanie Marlow MD [facility]

## 2023-10-16 NOTE — PROGRESS NOTES
Name: Todd Area  YOB: 1980  Gender: female  MRN: [id number]    CC:   Chief Complaint   Patient presents with    Follow-up     S/P left shoulder scope debridement, open biceps tenodesis DOS 8-3-23       Left Shoulder Scope Debridement - Left and Open Biceps Tenodesis - Left  8/3/2023    HPI: Patient is about 2 months status post left shoulder biceps tenodesis and debridement. She is working with therapy. No new changes. Allergies   Allergen Reactions    Ciprofloxacin Other (See Comments)     Leg numbness  Loss of sensation in side of both legs  10 yrs ago    Amoxicillin Rash    Penicillins Hives and Rash     History reviewed. No pertinent past medical history. Past Surgical History:   Procedure Laterality Date    BICEPS TENDON REPAIR Left 8/3/2023    OPEN BICEPS TENODESIS performed by Rain Fair MD at [address] [facility] Left 8/24/2022    BREAST LESION BIOPSY EXCISION performed by Annette Travis DO at [address] [facility] ARTHROSCOPY Left 8/3/2023    LEFT SHOULDER SCOPE DEBRIDEMENT performed by Rain Fair MD at [address] [facility] EXTRACTION       Family History   Problem Relation Age of Onset    Dementia Mother 68    Breast Cancer Mother 43    Gout Brother     Colon Cancer Other     Ovarian Cancer Neg Hx     Prostate Cancer Neg Hx     Deep Vein Thrombosis Neg Hx     Pulmonary Embolism Neg Hx      Social History     Socioeconomic History    Marital status: Single     Spouse name: Not on file    Number of children: Not on file    Years of education: Not on file    Highest education level: Not on file   Occupational History    Occupation:    Tobacco Use    Smoking status: Never    Smokeless tobacco: Never   Vaping Use    Vaping Use: Never used   Substance and Sexual Activity    Alcohol use:  Yes     Alcohol/week: 2.0 standard drinks of alcohol     Types: 2 Glasses of wine per week    Drug use: Never    Sexual activity: Yes     Partners:

## 2023-10-17 ENCOUNTER — HOSPITAL ENCOUNTER (OUTPATIENT)
Dept: PHYSICAL THERAPY | Age: 43
Setting detail: RECURRING SERIES
Discharge: HOME | End: 2023-10-20
Attending: ORTHOPAEDIC SURGERY
Payer: COMMERCIAL

## 2023-10-17 PROCEDURE — 97110 THERAPEUTIC EXERCISES: CPT

## 2023-10-17 PROCEDURE — 97140 MANUAL THERAPY 1/> REGIONS: CPT

## 2023-10-17 NOTE — PROGRESS NOTES
with cues for 3-5 second hold   20 reps/1 set with cues for 3-5 second hold - did with B behind back and then with RUE in front facilitating to inc L shld ext ROM 20 reps/1 set with cues for 3-5 second hold 20 reps/1 set with cues for 3-5 second hold 20 reps/1 set        Standing shoulder rows   Red theratubing resistance; 20 reps/1 set with cues for slow controlled movement            Standing shoulder extension   Red theratubing resistance; 20 reps/1 set with cues for slow movement with avoiding shoulder elevation and protraction            Varying resistance at each UE in multiple different planes of motion    Per strength assessment in chart            L shoulder AROM   Per ROM assessment in chart            Passive PNF Into D1 flexion/extension and D2 flexion/extension at L shoulder; mobilizing L arm passively for increased motion; 20 reps/1 set each direction Into D1 flexion/extension and D2 flexion/extension at L shoulder; mobilizing L arm passively for increased motion; 20 reps/1 set each direction             Passive shoulder abduction stretch  Education on propping L elbow on table then hanging minimal to moderate weight (I.e. light purse) at humeral head for inferior glide mobilization) as pt is able to tolerate             *given in HEP  [employer] Portal   Pt given following band colors: [x] Yellow          [x] Red          [] Green          [x] Blue          [] Grey      Manual Therapy: (23 minutes)   With pt in supported supine position:   - passive ROM to L shoulder into flexion, abduction, and ER at 15 and 45 degrees abduction to maximize L shoulder mobility and reduce pain (measured 75 degrees shoulder abduction passively)   - lateral distraction mobilizations at L glenohumeral joint (grade 2-3) with passive movement into L shoulder horizontal adduction for improved motion   - posterior/inferior mobilizations (grade 2-3) at L distal clavicle to improve acromioclavicular joint mobility   - gentle

## 2023-10-18 ENCOUNTER — OFFICE VISIT (OUTPATIENT)
Dept: ORTHOPEDIC SURGERY | Age: 43
End: 2023-10-18

## 2023-10-18 DIAGNOSIS — M25.512 LEFT SHOULDER PAIN, UNSPECIFIED CHRONICITY: Primary | ICD-10-CM

## 2023-10-18 DIAGNOSIS — Z09 SURGERY FOLLOW-UP: ICD-10-CM

## 2023-10-18 DIAGNOSIS — M75.02 ADHESIVE CAPSULITIS OF LEFT SHOULDER: ICD-10-CM

## 2023-10-18 RX ORDER — METHYLPREDNISOLONE ACETATE 40 MG/ML
80 INJECTION, SUSPENSION INTRA-ARTICULAR; INTRALESIONAL; INTRAMUSCULAR; SOFT TISSUE ONCE
Status: COMPLETED | OUTPATIENT
Start: 2023-10-18 | End: 2023-10-18

## 2023-10-18 RX ORDER — TRAMADOL HYDROCHLORIDE 50 MG/1
50-100 TABLET ORAL NIGHTLY PRN
Qty: 30 TABLET | Refills: 0 | Status: SHIPPED | OUTPATIENT
Start: 2023-10-18 | End: 2023-11-02

## 2023-10-18 RX ADMIN — METHYLPREDNISOLONE ACETATE 80 MG: 40 INJECTION, SUSPENSION INTRA-ARTICULAR; INTRALESIONAL; INTRAMUSCULAR; SOFT TISSUE at 14:15

## 2023-10-19 ENCOUNTER — HOSPITAL ENCOUNTER (OUTPATIENT)
Dept: PHYSICAL THERAPY | Age: 43
Setting detail: RECURRING SERIES
Discharge: HOME | End: 2023-10-22
Attending: ORTHOPAEDIC SURGERY
Payer: COMMERCIAL

## 2023-10-19 PROCEDURE — 97140 MANUAL THERAPY 1/> REGIONS: CPT

## 2023-10-19 PROCEDURE — 97110 THERAPEUTIC EXERCISES: CPT

## 2023-10-19 ASSESSMENT — PAIN SCALES - GENERAL: PAINLEVEL_OUTOF10: 3

## 2023-10-19 NOTE — PROGRESS NOTES
Prone shoulder rows* No weight; 20 reps/1 set L UE with cues to bring elbow by side               TRX hangs with shoulder flexion 30 second hold x 1 rep with cues to lean into stretch                               *given in HEP  [employer] Portal [id number]  Pt given following band colors: [x] Yellow          [x] Red          [] Green          [x] Blue          [] Grey      Manual Therapy: (9 minutes)   With pt in supported supine position:   - passive ROM to L shoulder into flexion, abduction, and ER at 15 and 45 degrees abduction to maximize L shoulder mobility and reduce pain (measured 116 degrees shoulder flexion passively)   - gentle inferior distraction and posterior mobilizations at L glenohumeral joint (grade 2-3) at 90 degrees shoulder flexion and in loose-packed position with movement into ER to improve mobility and reduce muscle guarding and pain      Modalities: (0 minutes)    Treatment/Session Summary:    Treatment Assessment:Pt continues to have significant limitations with L shoulder ER and abduction ROM, but improved shoulder flexion this session. Will cotninue to work on mobilizations to improve shoulder mobility further - encouraged pt to continue to work on exercises at home - gave her extensive HEP handout for home.   Communication/Consultation:  None today  Equipment provided today:  None  Recommendations/Intent for next treatment session: Next visit will focus on manual therapy/modalities as needed to improve ROM and reduce pain; work on progressive strengthening    Total Treatment Billable Duration:  50 minutes  Time In: 0800  Time Out: 1 [facility] 270, PT, DPT       Charge Capture  Post Session Pain  PT Visit 25 [facility]  MD 9 [facility]    Future Appointments   Date Time Provider [address] [place] [address]   10/23/2023 11:30 AM Javier Ochoa PT [facility]   10/25/2023  1:45 PM Javier Ochoa PT [facility]   11/1/2023 11:30 AM Terry Collins, PT

## 2023-10-23 ENCOUNTER — HOSPITAL ENCOUNTER (OUTPATIENT)
Dept: PHYSICAL THERAPY | Age: 43
Setting detail: RECURRING SERIES
Discharge: HOME | End: 2023-10-26
Attending: ORTHOPAEDIC SURGERY
Payer: COMMERCIAL

## 2023-10-23 PROCEDURE — 97110 THERAPEUTIC EXERCISES: CPT

## 2023-10-23 ASSESSMENT — PAIN SCALES - GENERAL: PAINLEVEL_OUTOF10: 2

## 2023-10-23 NOTE — PROGRESS NOTES
Avis Rocha  : 1980  Primary: Alex Robbins ([facility] QUINTANILLA)  Secondary:  Darlene Carter [facility] [place] [address]  Phone: [phone]  Fax: [phone] Plan Frequency: twice a week for 12 weeks    Plan of Care/Certification Expiration Date: 23      PT Visit Info:  Plan Frequency: twice a week for 12 weeks  Plan of Care/Certification Expiration Date: 23  Total # of Visits to Date: 22  Progress Note Counter: 4      Visit Count:  22    OUTPATIENT PHYSICAL THERAPY:  OP NOTE TYPE: Treatment Note 10/23/2023       Episode  Appt Desk             Treatment Diagnosis:  Pain in left shoulder (M25.512)   Stiffness of left shoulder, not elsewhere classified (M25.612)      Medical/Referring Diagnosis:  Left shoulder pain, unspecified chronicity [M25.512]  Bursitis of left shoulder [M75.52]  Shoulder injury related to vaccine administration (SIRVA) [S49.80XA, T50.Z95A]  Impingement syndrome of left shoulder [M75.42]  Referring Physician:  Leonid Hall MD MD Orders:  PT Eval and Treat   Date of Onset:  Onset Date: 23     Allergies:   Ciprofloxacin, Amoxicillin, and Penicillins  Restrictions/Precautions:  Restrictions/Precautions: -- (Pt now should have no lifting restrictions per biceps tenodesis protocol)  No data recorded     No overhead lifting of weights; limited to lifting 5# with the LUE    Subjective Comments: a little achey this morning but not bad. since the ache from the shot wore off it has been pretty good    Initial:    2/10  Post Session:        (no value provided - sore)10    Medications Last Reviewed:  10/23/2023  Updated Objective Findings:  None Today    Treatment   Therapeutic Exercise: (40 minutes):  Exercises per grid below to improve mobility, strength, and dynamic movement of left shoulder to improve functional lifting, carrying, reaching, and overhead activites.   Required minimal visual, verbal, and tactile cues to promote

## 2023-10-25 ENCOUNTER — HOSPITAL ENCOUNTER (OUTPATIENT)
Dept: PHYSICAL THERAPY | Age: 43
Setting detail: RECURRING SERIES
Discharge: HOME | End: 2023-10-28
Attending: ORTHOPAEDIC SURGERY
Payer: COMMERCIAL

## 2023-10-25 PROCEDURE — 97110 THERAPEUTIC EXERCISES: CPT

## 2023-10-25 ASSESSMENT — PAIN SCALES - GENERAL: PAINLEVEL_OUTOF10: 0

## 2023-10-25 NOTE — PROGRESS NOTES
[facility]  : 1980  Primary: Alex Ramires Sc ([place] QUINTANILLA)  Secondary:  Candace Osborne [facility] [place] [address]  Phone: [phone]  Fax: [phone] Plan Frequency: twice a week for 12 weeks    Plan of Care/Certification Expiration Date: 23      PT Visit Info:  Plan Frequency: twice a week for 12 weeks  Plan of Care/Certification Expiration Date: 23  Total # of Visits to Date: 23  Progress Note Counter: 5      Visit Count:  23    OUTPATIENT PHYSICAL THERAPY:  OP NOTE TYPE: Treatment Note 10/25/2023       Episode  Appt Desk             Treatment Diagnosis:  Pain in left shoulder (M25.512)   Stiffness of left shoulder, not elsewhere classified (M25.612)      Medical/Referring Diagnosis:  Left shoulder pain, unspecified chronicity [M25.512]  Bursitis of left shoulder [M75.52]  Shoulder injury related to vaccine administration (SIRVA) [S49.80XA, T50.Z95A]  Impingement syndrome of left shoulder [M75.42]  Referring Physician:  Rain Fair MD MD Orders:  PT Eval and Treat   Date of Onset:  Onset Date: 23     Allergies:   Ciprofloxacin, Amoxicillin, and Penicillins  Restrictions/Precautions:  Restrictions/Precautions: -- (Pt now should have no lifting restrictions per biceps tenodesis protocol)  No data recorded     No overhead lifting of weights; limited to lifting 5# with the LUE    Subjective Comments: was a little sore after the end of last visit, pushing on the sore spots within the office. no tramadol and has been overall pain free. Initial:    0/10  Post Session:       0/10    Medications Last Reviewed:  10/25/2023  Updated Objective Findings:  None Today    Treatment   Therapeutic Exercise: (43 minutes):  Exercises per grid below to improve mobility, strength, and dynamic movement of left shoulder to improve functional lifting, carrying, reaching, and overhead activites.   Required minimal visual, verbal, and tactile cues to

## 2023-11-01 ENCOUNTER — HOSPITAL ENCOUNTER (OUTPATIENT)
Dept: PHYSICAL THERAPY | Age: 43
Setting detail: RECURRING SERIES
Discharge: HOME | End: 2023-11-04
Attending: ORTHOPAEDIC SURGERY
Payer: COMMERCIAL

## 2023-11-01 PROCEDURE — 97140 MANUAL THERAPY 1/> REGIONS: CPT

## 2023-11-01 PROCEDURE — 97110 THERAPEUTIC EXERCISES: CPT

## 2023-11-01 ASSESSMENT — PAIN SCALES - GENERAL: PAINLEVEL_OUTOF10: 0

## 2023-11-01 NOTE — PROGRESS NOTES
Mike Colbert  : 1980  Primary: Maryeunice Robbins ([facility] [employer])  Secondary:  [address] [place] [address] [address]  Phone: [phone]  Fax: [phone] Plan Frequency: twice a week for 12 weeks    Plan of Care/Certification Expiration Date: 23      PT Visit Info:   Plan of Care/Certification Expiration Date:  Plan of Care/Certification Expiration Date: 23      Time In/Out:   Time In: 1130  Time Out: 1215    Plan Frequency: twice a week for 12 weeks  Total # of Visits to Date: 24  Progress Note Counter: 6      Visit Count:  24    OUTPATIENT PHYSICAL THERAPY:OP NOTE TYPE: Treatment Note 2023       Charge Capture   Episode              Treatment Diagnosis:    Pain in left shoulder (M25.512)   Stiffness of left shoulder, not elsewhere classified (M25.612)     Medical/Referring Diagnosis:    Referring Physician:  Loki Mederos MD MD Orders:  PT Eval and Treat   Return MD Appt:  2023  Date of Onset:  Onset Date: 23     Allergies:   Ciprofloxacin, Amoxicillin, and Penicillins  Restrictions/Precautions:    None  Interventions Planned See assessment note. Subjective Assessment: Pt states the steroid injection has helped her be able to sleep all night - states she has not taken the tramadol because she hasn't had as much pain and it might interfere with her ability to work    Initial Pain Level:}    0/10  Post Session Pain Level:       0/10  Medications Last Reviewed:  2023  Updated Objective Findings:   Shoulder abduction PROM: 83 degrees, shoulder ER PROM at 45 degrees abduction: 53 degrees; shoulder flexion PROM: 117 degrees; shoulder flexion AROM: 80 degrees  Treatment   Therapeutic Exercise: (32 minutes):  Exercises per grid below to improve mobility, strength, and dynamic movement of left shoulder to improve functional lifting, carrying, reaching, and overhead activites.   Required minimal visual, verbal, and tactile

## 2023-11-03 ENCOUNTER — HOSPITAL ENCOUNTER (OUTPATIENT)
Dept: PHYSICAL THERAPY | Age: 43
Setting detail: RECURRING SERIES
Discharge: HOME | End: 2023-11-06
Attending: ORTHOPAEDIC SURGERY
Payer: COMMERCIAL

## 2023-11-03 PROCEDURE — 97110 THERAPEUTIC EXERCISES: CPT

## 2023-11-03 PROCEDURE — 97140 MANUAL THERAPY 1/> REGIONS: CPT

## 2023-11-03 ASSESSMENT — PAIN SCALES - GENERAL: PAINLEVEL_OUTOF10: 0

## 2023-11-03 NOTE — PROGRESS NOTES
Valery Leanna  : 1980  Primary: Genna Foy Sc ([facility])  Secondary:  [address] [place] [address] [address]  Phone: [phone]  Fax: [phone] Plan Frequency: twice a week for 12 weeks    Plan of Care/Certification Expiration Date: 23      PT Visit Info:   Plan of Care/Certification Expiration Date:  Plan of Care/Certification Expiration Date: 23      Time In/Out:   Time In: 1134  Time Out: 1215    Plan Frequency: twice a week for 12 weeks  Total # of Visits to Date: 25  Progress Note Counter: 7      Visit Count:  25    OUTPATIENT PHYSICAL THERAPY:OP NOTE TYPE: Treatment Note 2023       Charge Capture   Episode              Treatment Diagnosis:    Pain in left shoulder (M25.512)   Stiffness of left shoulder, not elsewhere classified (M25.612)     Medical/Referring Diagnosis:    Referring Physician:  Jacques Shelton MD MD Orders:  PT Eval and Treat   Return MD Appt:  2023  Date of Onset:  Onset Date: 23     Allergies:   Ciprofloxacin, Amoxicillin, and Penicillins  Restrictions/Precautions:    None  Interventions Planned See assessment note. Subjective Assessment: Pt states her shoulder felt fine after last session but started to hurt a little this morning. Initial Pain Level:}    0/10  Post Session Pain Level:        (pt reporting no pain at end of session)/10  Medications Last Reviewed:  11/3/2023  Updated Objective Findings:   11/3/2023: Shoulder abduction PROM: 84 degrees  Treatment   Therapeutic Exercise: (26 minutes):  Exercises per grid below to improve mobility, strength, and dynamic movement of left shoulder to improve functional lifting, carrying, reaching, and overhead activites. Required minimal visual, verbal, and tactile cues to promote proper body alignment, promote proper body posture, promote proper body mechanics, and promote proper body breathing techniques.   Progressed resistance, range,

## 2023-11-06 ENCOUNTER — HOSPITAL ENCOUNTER (OUTPATIENT)
Dept: PHYSICAL THERAPY | Age: 43
Setting detail: RECURRING SERIES
Discharge: HOME | End: 2023-11-09
Attending: ORTHOPAEDIC SURGERY
Payer: COMMERCIAL

## 2023-11-06 PROCEDURE — 97140 MANUAL THERAPY 1/> REGIONS: CPT

## 2023-11-06 PROCEDURE — 97110 THERAPEUTIC EXERCISES: CPT

## 2023-11-06 NOTE — PROGRESS NOTES
Zenaida Bazan  : 1980  Primary: Naila Fontana Sc ([place] [employer])  Secondary:  [address] [facility] [address] [place] [address] [address]  Phone: [phone]  Fax: [phone] Plan Frequency: once a week for 5 weeks    Plan of Care/Certification Expiration Date: 24      PT Visit Info:   Plan of Care/Certification Expiration Date:  Plan of Care/Certification Expiration Date: 24      Time In/Out:   Time In: 1133  Time Out: 1215    Plan Frequency: once a week for 5 weeks  Total # of Visits to Date: 26  Progress Note Counter: 8      Visit Count:  26    OUTPATIENT PHYSICAL THERAPY:OP NOTE TYPE: Treatment Note 2023       Charge Capture   Episode              Treatment Diagnosis:    Pain in left shoulder (M25.512)   Stiffness of left shoulder, not elsewhere classified (M25.612)     Medical/Referring Diagnosis:    Referring Physician:  Renate Lesches, MD MD Orders:  PT Eval and Treat   Return MD Appt:  2023  Date of Onset:  Onset Date: 23     Allergies:   Ciprofloxacin, Amoxicillin, and Penicillins  Restrictions/Precautions:    None  Interventions Planned See assessment note. Subjective Assessment: Pt states she was able to reach up to do her hair this morning. Initial Pain Level:}     (no specific pain level reported)/10  Post Session Pain Level:        (no change in pain reported)/10  Medications Last Reviewed:  2023  Updated Objective Findings:  See Recertification Note from today  Treatment   Therapeutic Exercise: (17 minutes):  Exercises per grid below (and assessment in chart on 2023) to improve mobility, strength, and dynamic movement of left shoulder to improve functional lifting, carrying, reaching, and overhead activites. Required minimal visual, verbal, and tactile cues to promote proper body alignment, promote proper body posture, promote proper body mechanics, and promote proper body breathing techniques.   Progressed
report being able to swim freestyle with minimal to no increase in pain for 5 minutes in order to ensure return to normal activities. (PROGRESSING, 11/6/2023)          OUTCOME MEASURE:   Disabilities of the Arm, Shoulder and Hand (DASH) Questionnaire - Quick Version:   Score:  Initial: 43/55     9/5/2023: 33/55     10/11/2023: 32/55    Interpretation of Score: The DASH is designed to measure the activities of daily living in person's with upper extremity dysfunction or pain. Each section is scored on a 1-5 scale, 5 representing the greatest disability. The scores of each section are added together for a total score of 55. Regarding Sam Wilson's therapy, I certify that the treatment plan above will be carried out by a therapist or under their direction.   Thank you for this referral,  Branden Lau, PT     Referring Physician Signature: Lee Ann Chauhan MD                    Post Session Pain  Charge Capture  PT Visit Info MD Guidelines  Letty

## 2023-11-10 ENCOUNTER — CLINICAL DOCUMENTATION (OUTPATIENT)
Dept: PHYSICAL THERAPY | Age: 43
End: 2023-11-10

## 2023-11-13 ENCOUNTER — HOSPITAL ENCOUNTER (OUTPATIENT)
Dept: PHYSICAL THERAPY | Age: 43
Setting detail: RECURRING SERIES
Discharge: HOME | End: 2023-11-16
Attending: ORTHOPAEDIC SURGERY
Payer: COMMERCIAL

## 2023-11-13 PROCEDURE — 97140 MANUAL THERAPY 1/> REGIONS: CPT

## 2023-11-13 PROCEDURE — 97110 THERAPEUTIC EXERCISES: CPT

## 2023-11-13 ASSESSMENT — PAIN SCALES - GENERAL: PAINLEVEL_OUTOF10: 0

## 2023-11-13 NOTE — PROGRESS NOTES
Brad Nieves  : 1980  Primary: Karen Hackett Sc ([place] [facility])  Secondary:  [address] [place] [id number]  [address] [address]  Phone: [phone]  Fax: [phone] Plan Frequency: once a week for 5 weeks    Plan of Care/Certification Expiration Date: 24      Plan of Care/Certification Expiration Date:  Plan of Care/Certification Expiration Date: 24    Frequency/Duration: Plan Frequency: once a week for 5 weeks      Time In/Out:   Time In: 1132  Time Out: 1215    PT Visit Info:    Plan Frequency: once a week for 5 weeks  Total # of Visits to Date: 27  Progress Note Counter: 1        Visit Count:  27     OUTPATIENT PHYSICAL THERAPY:OP NOTE TYPE: Treatment Note 2023       Charge Capture   Episode              Treatment Diagnosis:    Pain in left shoulder (M25.512)   Stiffness of left shoulder, not elsewhere classified (M25.612)     Medical/Referring Diagnosis:    Referring Physician:  Soniya Vallecillo MD MD Orders:  PT Eval and Treat   Return MD Appt:  2023  Date of Onset:  Onset Date: 23     Allergies:   Ciprofloxacin, Amoxicillin, and Penicillins  Restrictions/Precautions:    None  Interventions Planned See assessment note. Subjective Assessment: Pt states she ran/walked a 5k over the weekend. States she currently does not have any pain, but was really sore earlier this morning when she first woke up and then at work as well. States she thinks this might be due to the injection wearing off. Pt reports she is doing the exercises as prescribed at home. Initial Pain Level:}    0/10  Post Session Pain Level:       0/10  Medications Last Reviewed:  2023  Updated Objective Findings:  None Today  Treatment   Therapeutic Exercise: (17 minutes):  Exercises per grid below to improve mobility, strength, and dynamic movement of left shoulder to improve functional lifting, carrying, reaching, and overhead activites.   Required minimal

## 2023-11-20 ENCOUNTER — HOSPITAL ENCOUNTER (OUTPATIENT)
Dept: PHYSICAL THERAPY | Age: 43
Setting detail: RECURRING SERIES
Discharge: HOME | End: 2023-11-23
Attending: ORTHOPAEDIC SURGERY
Payer: COMMERCIAL

## 2023-11-20 PROCEDURE — 97110 THERAPEUTIC EXERCISES: CPT

## 2023-11-20 PROCEDURE — 97140 MANUAL THERAPY 1/> REGIONS: CPT

## 2023-11-20 NOTE — PROGRESS NOTES
Valery Watersers  : 1980  Primary: Genna Foy Sc ([facility])  Secondary:  [address] [place] [address]  [address] [address]  Phone: [phone]  Fax: [phone] Plan Frequency: once a week for 5 weeks    Plan of Care/Certification Expiration Date: 24      Plan of Care/Certification Expiration Date:  Plan of Care/Certification Expiration Date: 24    Frequency/Duration: Plan Frequency: once a week for 5 weeks      Time In/Out:   Time In: 1138  Time Out: 1217    PT Visit Info:    Plan Frequency: once a week for 5 weeks  Total # of Visits to Date: 28  Progress Note Counter: 2        Visit Count:  28     OUTPATIENT PHYSICAL THERAPY:OP NOTE TYPE: Treatment Note 2023       Charge Capture   Episode              Treatment Diagnosis:    Pain in left shoulder (M25.512)   Stiffness of left shoulder, not elsewhere classified (M25.612)     Medical/Referring Diagnosis:    Referring Physician:  Jacques Shelton MD MD Orders:  PT Eval and Treat   Return MD Appt:  2023  Date of Onset:  Onset Date: 23     Allergies:   Ciprofloxacin, Amoxicillin, and Penicillins  Restrictions/Precautions:    None  Interventions Planned See assessment note. Subjective Assessment: Pt coming in at 11:08 today. States she got held up with traffic. Pt stating that her injection seems to be wearing off and her shoulder is aching more again. Initial Pain Level:}     (no specific pain level reported)/10  Post Session Pain Level:        (no specific pain level reported)/10  Medications Last Reviewed:  2023  Updated Objective Findings:  None Today  Treatment   Therapeutic Exercise: (23 minutes):  Exercises per grid below to improve mobility, strength, and dynamic movement of left shoulder to improve functional lifting, carrying, reaching, and overhead activites.   Required minimal visual, verbal, and tactile cues to promote proper body alignment, promote proper body

## 2023-11-29 ENCOUNTER — OFFICE VISIT (OUTPATIENT)
Dept: ORTHOPEDIC SURGERY | Age: 43
End: 2023-11-29
Payer: COMMERCIAL

## 2023-11-29 VITALS — HEIGHT: 68 IN | WEIGHT: 146 LBS | BODY MASS INDEX: 22.13 KG/M2

## 2023-11-29 DIAGNOSIS — Z09 SURGERY FOLLOW-UP: ICD-10-CM

## 2023-11-29 DIAGNOSIS — M25.512 LEFT SHOULDER PAIN, UNSPECIFIED CHRONICITY: Primary | ICD-10-CM

## 2023-11-29 DIAGNOSIS — M75.02 ADHESIVE CAPSULITIS OF LEFT SHOULDER: ICD-10-CM

## 2023-11-29 PROCEDURE — 99214 OFFICE O/P EST MOD 30 MIN: CPT | Performed by: ORTHOPAEDIC SURGERY

## 2023-11-29 NOTE — PROGRESS NOTES
complications including but not limited to bleeding, infection, neurovascular injury, stiffness, cosmetic abnormality of skin or muscle for biceps related surgery, pain, continued problems, DVT, PE, hardware failure, fracture, heterotopic ossification, MI and other anesthesia related risks, etc.  They seem to understand and wish to proceed. I gave them education literature and answered their questions. Return for Surgery.      Kobi Middleton MD  11/29/23

## 2023-12-01 ENCOUNTER — HOSPITAL ENCOUNTER (OUTPATIENT)
Dept: PHYSICAL THERAPY | Age: 43
Setting detail: RECURRING SERIES
End: 2023-12-01
Attending: ORTHOPAEDIC SURGERY
Payer: COMMERCIAL

## 2023-12-01 NOTE — PROGRESS NOTES
Sheila Wilson  : 1980  Primary: Bcana Sc  Secondary:  [facility] @ [place] [address]  [place] [address]  Phone: [phone]  Fax: [phone] Plan Frequency: once a week for 5 weeks    Plan of Care/Certification Expiration Date: 24      PT Visit Info:  Total # of Visits to Date: 28  Progress Note Counter: 2         OUTPATIENT PHYSICAL THERAPY 2023     Appt Desk   Episode   MyChart      Ms. Wilson was a >24 hour cancellation for today's appointment. She is going to have manipulation on her L shoulder done later this year and then come in the last 2 days of December to maximize her range.      Monica Collins PT    Future Appointments   Date Time Provider Department Center   2023  7:00 PM Monica Collins, PT [facility]   2023  1:00 PM Kiki Krishnamurthy MD [facility]   2023  2:30 PM Monica Collins PT [facility]   2023  2:30 PM Monica Collins, PT [facility]   2024  7:30 AM [facility]   2024  8:00 AM Steph Renteria MD [facility]

## 2023-12-05 DIAGNOSIS — M25.512 LEFT SHOULDER PAIN, UNSPECIFIED CHRONICITY: Primary | ICD-10-CM

## 2023-12-05 DIAGNOSIS — M75.02 ADHESIVE CAPSULITIS OF LEFT SHOULDER: ICD-10-CM

## 2023-12-05 DIAGNOSIS — Z09 SURGERY FOLLOW-UP: ICD-10-CM

## 2023-12-20 PROBLEM — J30.2 SEASONAL ALLERGIES: Status: ACTIVE | Noted: 2023-12-20

## 2023-12-20 PROBLEM — E78.5 HYPERLIPIDEMIA: Status: ACTIVE | Noted: 2023-12-20

## 2023-12-20 PROBLEM — N63.10 BREAST MASS, RIGHT: Status: RESOLVED | Noted: 2022-08-09 | Resolved: 2023-12-20

## 2023-12-20 PROBLEM — Z75.8 DOES NOT HAVE PRIMARY CARE PROVIDER: Status: RESOLVED | Noted: 2023-09-13 | Resolved: 2023-12-20

## 2023-12-21 RX ORDER — LORATADINE 10 MG/1
10 CAPSULE, LIQUID FILLED ORAL DAILY
COMMUNITY

## 2023-12-21 NOTE — PERIOP NOTE
Patient verified name and . Order for consent not found in EHR and matches case posting; patient verifies procedure. Type 1B surgery, Phone assessment complete. Orders not received. Labs per surgeon: none  Labs per anesthesia protocol: none    Patient answered medical/surgical history questions at their best of ability. All prior to admission medications documented in EPIC. Patient instructed to take the following medications the day of surgery according to anesthesia guidelines with a small sip of water: Loratadine (Claritin)  On the day before surgery please take 2 Tylenol in the morning and then again before bed. You may use either regular or extra strength. Hold all vitamins 7 days prior to surgery and NSAIDS 5 days prior to surgery. Prescription meds to hold:none  Patient instructed on the following:    > Arrive at [address], time of arrival to be called the day before by 1700  > NPO after midnight, unless otherwise indicated, including gum, mints, and ice chips  > Responsible adult must drive patient to the hospital, stay during surgery, and patient will need supervision 24 hours after anesthesia  > Use non moisturizing soap in shower the night before surgery and on the morning of surgery  > All piercings must be removed prior to arrival.    > Leave all valuables (money and jewelry) at home but bring insurance card and ID on DOS.   > You may be required to pay a deductible or co-pay on the day of your procedure. You can pre-pay by calling [phone] if your surgery is at the [facility] or [phone] if your surgery is at the [facility]. > Do not wear make-up, nail polish, lotions, cologne, perfumes, powders, or oil on skin. Artificial nails are not permitted.

## 2023-12-27 ENCOUNTER — ANESTHESIA EVENT (OUTPATIENT)
Dept: SURGERY | Age: 43
End: 2023-12-27
Payer: COMMERCIAL

## 2023-12-27 DIAGNOSIS — Z09 SURGERY FOLLOW-UP: Primary | ICD-10-CM

## 2023-12-27 RX ORDER — ONDANSETRON 8 MG/1
4 TABLET, ORALLY DISINTEGRATING ORAL EVERY 6 HOURS
Qty: 16 TABLET | Refills: 0 | Status: SHIPPED | OUTPATIENT
Start: 2023-12-27

## 2023-12-27 RX ORDER — AMOXICILLIN 250 MG
1 CAPSULE ORAL DAILY
Qty: 21 TABLET | Refills: 0 | Status: SHIPPED | OUTPATIENT
Start: 2023-12-27

## 2023-12-27 RX ORDER — OXYCODONE AND ACETAMINOPHEN 7.5; 325 MG/1; MG/1
1-2 TABLET ORAL
Qty: 36 TABLET | Refills: 0 | Status: SHIPPED | OUTPATIENT
Start: 2023-12-27 | End: 2023-12-30

## 2023-12-27 RX ORDER — NALOXONE HYDROCHLORIDE 4 MG/.1ML
1 SPRAY NASAL PRN
Qty: 1 EACH | Refills: 1 | Status: SHIPPED | OUTPATIENT
Start: 2023-12-27

## 2023-12-27 RX ORDER — MELOXICAM 7.5 MG/1
7.5 TABLET ORAL 2 TIMES DAILY
Qty: 28 TABLET | Refills: 0 | Status: SHIPPED | OUTPATIENT
Start: 2023-12-27 | End: 2024-01-10

## 2023-12-27 RX ORDER — ASPIRIN 325 MG
325 TABLET ORAL DAILY
Qty: 7 TABLET | Refills: 0 | Status: SHIPPED | OUTPATIENT
Start: 2023-12-27 | End: 2024-01-03

## 2023-12-27 NOTE — PERIOP NOTE
Preop department called to notify patient of arrival time for scheduled procedure. Instructions given to   - Arrive at [address] [facility]. - Remain NPO after midnight, unless otherwise indicated, including gum, mints, and ice chips. - Have a responsible adult to drive patient to the hospital, stay during surgery, and patient will need supervision 24 hours after anesthesia. - Use antibacterial soap in shower the night before surgery and on the morning of surgery.        Was patient contacted: yes, spoke to patient   Voicemail left:   Numbers contacted: [phone]

## 2023-12-28 ENCOUNTER — HOSPITAL ENCOUNTER (OUTPATIENT)
Dept: PHYSICAL THERAPY | Age: 43
Setting detail: RECURRING SERIES
Discharge: HOME | End: 2023-12-31
Attending: ORTHOPAEDIC SURGERY
Payer: COMMERCIAL

## 2023-12-28 ENCOUNTER — ANESTHESIA (OUTPATIENT)
Dept: SURGERY | Age: 43
End: 2023-12-28
Payer: COMMERCIAL

## 2023-12-28 ENCOUNTER — HOSPITAL ENCOUNTER (OUTPATIENT)
Age: 43
Setting detail: OUTPATIENT SURGERY
Discharge: HOME | End: 2023-12-28
Attending: ORTHOPAEDIC SURGERY | Admitting: ORTHOPAEDIC SURGERY
Payer: COMMERCIAL

## 2023-12-28 VITALS
SYSTOLIC BLOOD PRESSURE: 115 MMHG | WEIGHT: 151 LBS | OXYGEN SATURATION: 96 % | DIASTOLIC BLOOD PRESSURE: 56 MMHG | RESPIRATION RATE: 13 BRPM | HEART RATE: 100 BPM | HEIGHT: 67 IN | TEMPERATURE: 97.5 F | BODY MASS INDEX: 23.7 KG/M2

## 2023-12-28 PROCEDURE — 7100000010 HC PHASE II RECOVERY - FIRST 15 MIN: Performed by: ORTHOPAEDIC SURGERY

## 2023-12-28 PROCEDURE — 2500000003 HC RX 250 WO HCPCS: Performed by: NURSE ANESTHETIST, CERTIFIED REGISTERED

## 2023-12-28 PROCEDURE — 2500000003 HC RX 250 WO HCPCS: Performed by: ORTHOPAEDIC SURGERY

## 2023-12-28 PROCEDURE — 6360000002 HC RX W HCPCS: Performed by: NURSE ANESTHETIST, CERTIFIED REGISTERED

## 2023-12-28 PROCEDURE — 6360000002 HC RX W HCPCS: Performed by: ORTHOPAEDIC SURGERY

## 2023-12-28 PROCEDURE — 2580000003 HC RX 258: Performed by: ANESTHESIOLOGY

## 2023-12-28 PROCEDURE — 64415 NJX AA&/STRD BRCH PLXS IMG: CPT | Performed by: ANESTHESIOLOGY

## 2023-12-28 PROCEDURE — 7100000011 HC PHASE II RECOVERY - ADDTL 15 MIN: Performed by: ORTHOPAEDIC SURGERY

## 2023-12-28 PROCEDURE — 97140 MANUAL THERAPY 1/> REGIONS: CPT

## 2023-12-28 PROCEDURE — 7100000001 HC PACU RECOVERY - ADDTL 15 MIN: Performed by: ORTHOPAEDIC SURGERY

## 2023-12-28 PROCEDURE — 6360000002 HC RX W HCPCS: Performed by: ANESTHESIOLOGY

## 2023-12-28 PROCEDURE — 2720000010 HC SURG SUPPLY STERILE: Performed by: ORTHOPAEDIC SURGERY

## 2023-12-28 PROCEDURE — 3600000014 HC SURGERY LEVEL 4 ADDTL 15MIN: Performed by: ORTHOPAEDIC SURGERY

## 2023-12-28 PROCEDURE — 3700000000 HC ANESTHESIA ATTENDED CARE: Performed by: ORTHOPAEDIC SURGERY

## 2023-12-28 PROCEDURE — 3600000004 HC SURGERY LEVEL 4 BASE: Performed by: ORTHOPAEDIC SURGERY

## 2023-12-28 PROCEDURE — 6360000002 HC RX W HCPCS: Performed by: PHYSICIAN ASSISTANT

## 2023-12-28 PROCEDURE — 2709999900 HC NON-CHARGEABLE SUPPLY: Performed by: ORTHOPAEDIC SURGERY

## 2023-12-28 PROCEDURE — 3700000001 HC ADD 15 MINUTES (ANESTHESIA): Performed by: ORTHOPAEDIC SURGERY

## 2023-12-28 PROCEDURE — 97530 THERAPEUTIC ACTIVITIES: CPT

## 2023-12-28 PROCEDURE — 7100000000 HC PACU RECOVERY - FIRST 15 MIN: Performed by: ORTHOPAEDIC SURGERY

## 2023-12-28 RX ORDER — TRANEXAMIC ACID 100 MG/ML
INJECTION, SOLUTION INTRAVENOUS PRN
Status: DISCONTINUED | OUTPATIENT
Start: 2023-12-28 | End: 2023-12-28 | Stop reason: SDUPTHER

## 2023-12-28 RX ORDER — SUCCINYLCHOLINE/SOD CL,ISO/PF 200MG/10ML
SYRINGE (ML) INTRAVENOUS PRN
Status: DISCONTINUED | OUTPATIENT
Start: 2023-12-28 | End: 2023-12-28 | Stop reason: SDUPTHER

## 2023-12-28 RX ORDER — LIDOCAINE HYDROCHLORIDE 10 MG/ML
1 INJECTION, SOLUTION INFILTRATION; PERINEURAL
Status: DISCONTINUED | OUTPATIENT
Start: 2023-12-28 | End: 2023-12-28 | Stop reason: HOSPADM

## 2023-12-28 RX ORDER — ROCURONIUM BROMIDE 10 MG/ML
INJECTION, SOLUTION INTRAVENOUS PRN
Status: DISCONTINUED | OUTPATIENT
Start: 2023-12-28 | End: 2023-12-28 | Stop reason: SDUPTHER

## 2023-12-28 RX ORDER — DEXAMETHASONE SODIUM PHOSPHATE 10 MG/ML
INJECTION, SOLUTION INTRAMUSCULAR; INTRAVENOUS
Status: COMPLETED | OUTPATIENT
Start: 2023-12-28 | End: 2023-12-28

## 2023-12-28 RX ORDER — SODIUM CHLORIDE 9 MG/ML
INJECTION, SOLUTION INTRAVENOUS PRN
Status: DISCONTINUED | OUTPATIENT
Start: 2023-12-28 | End: 2023-12-28 | Stop reason: HOSPADM

## 2023-12-28 RX ORDER — OXYCODONE HYDROCHLORIDE 5 MG/1
10 TABLET ORAL PRN
Status: DISCONTINUED | OUTPATIENT
Start: 2023-12-28 | End: 2023-12-28 | Stop reason: HOSPADM

## 2023-12-28 RX ORDER — ONDANSETRON 2 MG/ML
INJECTION INTRAMUSCULAR; INTRAVENOUS PRN
Status: DISCONTINUED | OUTPATIENT
Start: 2023-12-28 | End: 2023-12-28 | Stop reason: SDUPTHER

## 2023-12-28 RX ORDER — ROPIVACAINE HYDROCHLORIDE 5 MG/ML
INJECTION, SOLUTION EPIDURAL; INFILTRATION; PERINEURAL
Status: COMPLETED | OUTPATIENT
Start: 2023-12-28 | End: 2023-12-28

## 2023-12-28 RX ORDER — DEXAMETHASONE SODIUM PHOSPHATE 10 MG/ML
INJECTION INTRAMUSCULAR; INTRAVENOUS PRN
Status: DISCONTINUED | OUTPATIENT
Start: 2023-12-28 | End: 2023-12-28 | Stop reason: SDUPTHER

## 2023-12-28 RX ORDER — PROPOFOL 10 MG/ML
INJECTION, EMULSION INTRAVENOUS PRN
Status: DISCONTINUED | OUTPATIENT
Start: 2023-12-28 | End: 2023-12-28 | Stop reason: SDUPTHER

## 2023-12-28 RX ORDER — DIPHENHYDRAMINE HYDROCHLORIDE 50 MG/ML
12.5 INJECTION INTRAMUSCULAR; INTRAVENOUS
Status: DISCONTINUED | OUTPATIENT
Start: 2023-12-28 | End: 2023-12-28 | Stop reason: HOSPADM

## 2023-12-28 RX ORDER — LIDOCAINE HYDROCHLORIDE 20 MG/ML
INJECTION, SOLUTION EPIDURAL; INFILTRATION; INTRACAUDAL; PERINEURAL PRN
Status: DISCONTINUED | OUTPATIENT
Start: 2023-12-28 | End: 2023-12-28 | Stop reason: SDUPTHER

## 2023-12-28 RX ORDER — ONDANSETRON 2 MG/ML
4 INJECTION INTRAMUSCULAR; INTRAVENOUS
Status: DISCONTINUED | OUTPATIENT
Start: 2023-12-28 | End: 2023-12-28 | Stop reason: HOSPADM

## 2023-12-28 RX ORDER — SODIUM CHLORIDE 0.9 % (FLUSH) 0.9 %
5-40 SYRINGE (ML) INJECTION PRN
Status: DISCONTINUED | OUTPATIENT
Start: 2023-12-28 | End: 2023-12-28 | Stop reason: HOSPADM

## 2023-12-28 RX ORDER — OXYCODONE HYDROCHLORIDE 5 MG/1
5 TABLET ORAL PRN
Status: DISCONTINUED | OUTPATIENT
Start: 2023-12-28 | End: 2023-12-28 | Stop reason: HOSPADM

## 2023-12-28 RX ORDER — LIDOCAINE HYDROCHLORIDE AND EPINEPHRINE 10; 10 MG/ML; UG/ML
INJECTION, SOLUTION INFILTRATION; PERINEURAL PRN
Status: DISCONTINUED | OUTPATIENT
Start: 2023-12-28 | End: 2023-12-28 | Stop reason: ALTCHOICE

## 2023-12-28 RX ORDER — HYDROMORPHONE HYDROCHLORIDE 2 MG/ML
0.5 INJECTION, SOLUTION INTRAMUSCULAR; INTRAVENOUS; SUBCUTANEOUS EVERY 5 MIN PRN
Status: DISCONTINUED | OUTPATIENT
Start: 2023-12-28 | End: 2023-12-28 | Stop reason: HOSPADM

## 2023-12-28 RX ORDER — HYDROMORPHONE HYDROCHLORIDE 2 MG/ML
0.25 INJECTION, SOLUTION INTRAMUSCULAR; INTRAVENOUS; SUBCUTANEOUS EVERY 5 MIN PRN
Status: DISCONTINUED | OUTPATIENT
Start: 2023-12-28 | End: 2023-12-28 | Stop reason: HOSPADM

## 2023-12-28 RX ORDER — SODIUM CHLORIDE, SODIUM LACTATE, POTASSIUM CHLORIDE, CALCIUM CHLORIDE 600; 310; 30; 20 MG/100ML; MG/100ML; MG/100ML; MG/100ML
INJECTION, SOLUTION INTRAVENOUS CONTINUOUS
Status: DISCONTINUED | OUTPATIENT
Start: 2023-12-28 | End: 2023-12-28 | Stop reason: HOSPADM

## 2023-12-28 RX ORDER — SODIUM CHLORIDE 0.9 % (FLUSH) 0.9 %
5-40 SYRINGE (ML) INJECTION EVERY 12 HOURS SCHEDULED
Status: DISCONTINUED | OUTPATIENT
Start: 2023-12-28 | End: 2023-12-28 | Stop reason: HOSPADM

## 2023-12-28 RX ORDER — EPINEPHRINE 1 MG/ML(1)
AMPUL (ML) INJECTION PRN
Status: DISCONTINUED | OUTPATIENT
Start: 2023-12-28 | End: 2023-12-28 | Stop reason: ALTCHOICE

## 2023-12-28 RX ORDER — MIDAZOLAM HYDROCHLORIDE 2 MG/2ML
2 INJECTION, SOLUTION INTRAMUSCULAR; INTRAVENOUS
Status: COMPLETED | OUTPATIENT
Start: 2023-12-28 | End: 2023-12-28

## 2023-12-28 RX ORDER — SODIUM CHLORIDE 9 MG/ML
INJECTION, SOLUTION INTRAVENOUS CONTINUOUS
Status: DISCONTINUED | OUTPATIENT
Start: 2023-12-28 | End: 2023-12-28 | Stop reason: HOSPADM

## 2023-12-28 RX ORDER — METHYLPREDNISOLONE ACETATE 40 MG/ML
INJECTION, SUSPENSION INTRA-ARTICULAR; INTRALESIONAL; INTRAMUSCULAR; SOFT TISSUE PRN
Status: DISCONTINUED | OUTPATIENT
Start: 2023-12-28 | End: 2023-12-28 | Stop reason: ALTCHOICE

## 2023-12-28 RX ORDER — FENTANYL CITRATE 50 UG/ML
100 INJECTION, SOLUTION INTRAMUSCULAR; INTRAVENOUS
Status: COMPLETED | OUTPATIENT
Start: 2023-12-28 | End: 2023-12-28

## 2023-12-28 RX ORDER — EPHEDRINE SULFATE/0.9% NACL/PF 50 MG/5 ML
SYRINGE (ML) INTRAVENOUS PRN
Status: DISCONTINUED | OUTPATIENT
Start: 2023-12-28 | End: 2023-12-28 | Stop reason: SDUPTHER

## 2023-12-28 RX ORDER — PROCHLORPERAZINE EDISYLATE 5 MG/ML
5 INJECTION INTRAMUSCULAR; INTRAVENOUS
Status: DISCONTINUED | OUTPATIENT
Start: 2023-12-28 | End: 2023-12-28 | Stop reason: HOSPADM

## 2023-12-28 RX ADMIN — FENTANYL CITRATE 100 MCG: 50 INJECTION, SOLUTION INTRAMUSCULAR; INTRAVENOUS at 06:38

## 2023-12-28 RX ADMIN — PROPOFOL 170 MG: 10 INJECTION, EMULSION INTRAVENOUS at 07:02

## 2023-12-28 RX ADMIN — Medication 2000 MG: at 06:56

## 2023-12-28 RX ADMIN — Medication 170 MG: at 07:02

## 2023-12-28 RX ADMIN — ROCURONIUM BROMIDE 5 MG: 10 INJECTION, SOLUTION INTRAVENOUS at 07:02

## 2023-12-28 RX ADMIN — ONDANSETRON 4 MG: 2 INJECTION INTRAMUSCULAR; INTRAVENOUS at 07:39

## 2023-12-28 RX ADMIN — SODIUM CHLORIDE, SODIUM LACTATE, POTASSIUM CHLORIDE, AND CALCIUM CHLORIDE: 600; 310; 30; 20 INJECTION, SOLUTION INTRAVENOUS at 07:39

## 2023-12-28 RX ADMIN — Medication 10 MG: at 07:11

## 2023-12-28 RX ADMIN — DEXAMETHASONE SODIUM PHOSPHATE 4 MG: 10 INJECTION, SOLUTION INTRAMUSCULAR; INTRAVENOUS at 06:38

## 2023-12-28 RX ADMIN — TRANEXAMIC ACID 1000 MG: 100 INJECTION, SOLUTION INTRAVENOUS at 07:07

## 2023-12-28 RX ADMIN — SODIUM CHLORIDE, SODIUM LACTATE, POTASSIUM CHLORIDE, AND CALCIUM CHLORIDE: 600; 310; 30; 20 INJECTION, SOLUTION INTRAVENOUS at 06:56

## 2023-12-28 RX ADMIN — MIDAZOLAM 2 MG: 1 INJECTION INTRAMUSCULAR; INTRAVENOUS at 06:38

## 2023-12-28 RX ADMIN — ROPIVACAINE HYDROCHLORIDE 24 ML: 5 INJECTION, SOLUTION EPIDURAL; INFILTRATION; PERINEURAL at 06:38

## 2023-12-28 RX ADMIN — DEXAMETHASONE SODIUM PHOSPHATE 10 MG: 10 INJECTION INTRAMUSCULAR; INTRAVENOUS at 07:29

## 2023-12-28 RX ADMIN — Medication 10 MG: at 07:25

## 2023-12-28 RX ADMIN — Medication 5 MG: at 07:17

## 2023-12-28 RX ADMIN — PROPOFOL 30 MG: 10 INJECTION, EMULSION INTRAVENOUS at 07:37

## 2023-12-28 RX ADMIN — LIDOCAINE HYDROCHLORIDE 100 MG: 20 INJECTION, SOLUTION EPIDURAL; INFILTRATION; INTRACAUDAL; PERINEURAL at 07:02

## 2023-12-28 ASSESSMENT — PAIN SCALES - GENERAL: PAINLEVEL_OUTOF10: 0

## 2023-12-28 NOTE — ANESTHESIA PRE PROCEDURE
108 09/13/2023 11:10 AM    CO2 26 09/13/2023 11:10 AM    BUN 7 09/13/2023 11:10 AM    CREATININE 0.90 09/13/2023 11:10 AM    GFRAA >60 06/13/2022 12:17 PM    LABGLOM >60 09/13/2023 11:10 AM    GLUCOSE 97 09/13/2023 11:10 AM    PROT 7.4 09/13/2023 11:10 AM    CALCIUM 9.7 09/13/2023 11:10 AM    BILITOT 0.4 09/13/2023 11:10 AM    ALKPHOS 70 09/13/2023 11:10 AM    AST 21 09/13/2023 11:10 AM    ALT 56 09/13/2023 11:10 AM       POC Tests: No results for input(s): \"POCGLU\", \"POCNA\", \"POCK\", \"POCCL\", \"POCBUN\", \"POCHEMO\", \"POCHCT\" in the last 72 hours. Coags: No results found for: \"PROTIME\", \"INR\", \"APTT\"    HCG (If Applicable):   Lab Results   Component Value Date    PREGTESTUR Negative 08/03/2023        ABGs: No results found for: \"PHART\", \"PO2ART\", \"HMM8OTG\", \"TQN8YSA\", \"BEART\", \"N1MJUVWV\"     Type & Screen (If Applicable):  No results found for: \"LABABO\", \"LABRH\"    Drug/Infectious Status (If Applicable):  No results found for: \"HIV\", \"HEPCAB\"    COVID-19 Screening (If Applicable): No results found for: \"COVID19\"        Anesthesia Evaluation  Patient summary reviewed and Nursing notes reviewed  Airway: Mallampati: I  TM distance: >3 FB   Neck ROM: full  Mouth opening: > = 3 FB   Dental: normal exam         Pulmonary:Negative Pulmonary ROS and normal exam  breath sounds clear to auscultation                             Cardiovascular:Negative CV ROS  Exercise tolerance: good (>4 METS)          Rhythm: regular  Rate: normal                    Neuro/Psych:   Negative Neuro/Psych ROS              GI/Hepatic/Renal: Neg GI/Hepatic/Renal ROS            Endo/Other: Negative Endo/Other ROS                    Abdominal:             Vascular: negative vascular ROS. Other Findings:             Anesthesia Plan      general     ASA 1     (Did well with ancef and ISB last time)  Induction: intravenous. Anesthetic plan and risks discussed with patient.               Post-op pain plan if not by surgeon: single peripheral

## 2023-12-28 NOTE — ANESTHESIA PROCEDURE NOTES
Peripheral Block    Patient location during procedure: procedure area  Reason for block: post-op pain management and at surgeon's request  Start time: 12/28/2023 6:38 AM  End time: 12/28/2023 6:42 AM  Staffing  Performed: anesthesiologist   Anesthesiologist: Adrian Allison MD  Performed by: Adrian Allison MD  Authorized by: Adrian Allison MD    Preanesthetic Checklist  Completed: patient identified, IV checked, site marked, risks and benefits discussed, surgical/procedural consents, pre-op evaluation, timeout performed, anesthesia consent given, oxygen available and monitors applied/VS acknowledged  Peripheral Block   Patient position: supine  Prep: ChloraPrep  Provider prep: mask  Patient monitoring: cardiac monitor, continuous pulse ox, frequent blood pressure checks, IV access, oxygen and responsive to questions  Block type: Brachial plexus  Interscalene  Laterality: left  Injection technique: single-shot  Guidance: ultrasound guided    Needle   Needle type: insulated echogenic nerve stimulator needle   Needle gauge: 20 G  Needle localization: ultrasound guidance  Needle length: 10 cm  Assessment   Injection assessment: negative aspiration for heme, no paresthesia on injection, local visualized surrounding nerve on ultrasound and no intravascular symptoms  Slow fractionated injection: yes  Hemodynamics: stable  Real-time US image taken/store: yes  Outcomes: uncomplicated and patient tolerated procedure well    Additional Notes  Active ultrasound used to identify the location of the brachial plexus at the interscalene groove and also the surrounding structures, and the block was performed using real time ultrasound. Permanent image saved.     Ropiv 0.5% 24ml + 100mcg epi + Decadron 4mg    Medications Administered  ropivacaine (NAROPIN) injection 0.5% - Perineural   24 mL - 12/28/2023 6:38:00 AM  dexamethasone (DECADRON) (PF) 10 mg/mL injection - Other   4 mg - 12/28/2023 6:38:00 AM

## 2023-12-28 NOTE — PROGRESS NOTES
Sheila Ndiaye Steve  : 1980  Primary: Rich Robbins ([place] [employer])  Secondary:  [facility] @ [place] [address]  [place] [address]  Phone: [phone]  Fax: [phone] Plan Frequency: 4 times a week until 2024, followed by twice a week until 3/27/2024    Plan of Care/Certification Expiration Date: 24      Plan of Care/Certification Expiration Date:  Plan of Care/Certification Expiration Date: 24    Frequency/Duration: Plan Frequency: 4 times a week until 2024, followed by twice a week until 3/27/2024      Time In/Out:   Time In: 1430  Time Out: 1515    PT Visit Info:    Plan Frequency: 4 times a week until 2024, followed by twice a week until 3/27/2024  Total # of Visits to Date: 29  Progress Note Counter: 1        Visit Count:  29     OUTPATIENT PHYSICAL THERAPY:OP NOTE TYPE: Treatment Note 2023       Charge Capture   Episode              Treatment Diagnosis:    Pain in left shoulder (M25.512)   Stiffness of left shoulder, not elsewhere classified (M25.612)     Medical/Referring Diagnosis:    Referring Physician:  RAHEL High MD MD Orders:  PT Eval and Treat   Return MD Appt:  2023  Date of Onset:  Onset Date: 23     Allergies:   Ciprofloxacin, Amoxicillin, and Penicillins  Restrictions/Precautions:    None  Interventions Planned See assessment note.      Subjective Assessment: Pt received L shoulder manipulation under anesthesia with lysis of adhesions earlier this morning. She is currently in sling and has minimal sensation and no active movement of L shoulder secondary to nerve block.    Initial Pain Level:}    0/10  Post Session Pain Level:       0/10  Medications Last Reviewed:  2023  Updated Objective Findings:  See Recertification Note from today  Treatment   Therapeutic Exercise: ():  Exercises per grid below to improve mobility, strength, and dynamic movement of left shoulder to improve functional lifting, 
PROGRESSING, 12/28/2023)   Pt will be able to demonstrate good body mechanics while lifting 8 lb object up from the floor in order to minimize pain while lifting load of laundry. (PROGRESSING, 12/28/2023)   Pt will report being able to swim freestyle with minimal to no increase in pain for 5 minutes in order to ensure return to normal activities. (PROGRESSING, 12/28/2023)          OUTCOME MEASURE:   Disabilities of the Arm, Shoulder and Hand (DASH) Questionnaire - Quick Version:   Score:  Initial: 43/55     9/5/2023: 33/55     10/11/2023: 32/55 12/28/2023: 29/55   Interpretation of Score: The DASH is designed to measure the activities of daily living in person's with upper extremity dysfunction or pain.  Each section is scored on a 1-5 scale, 5 representing the greatest disability.  The scores of each section are added together for a total score of 55.      Regarding Sheila Ndiaye Steve's therapy, I certify that the treatment plan above will be carried out by a therapist or under their direction.  Thank you for this referral,  Monica Collins, PT     Referring Physician Signature: RAHEL High MD                    Post Session Pain  Charge Capture  PT Visit Info MD Aman Saenz

## 2023-12-28 NOTE — OP NOTE
Operative Note    Preoperative diagnosis:  Left shoulder pain, unspecified chronicity [M25.512]  Surgery follow-up [Z09]  Adhesive capsulitis of left shoulder [M75.02]    Postoperative diagnosis: Left shoulder adhesive capsulitis    Surgeon(s) and Role:     Kylie Maldonado MD - Primary     Antibiotics: 2 g Ancef IV    Assistant: Mejia Mott,  assist, assisted during the procedure. She was necessary for patient positioning, wound closure, and assistance with the major portions of the operation. Her presence decreased the operative time and potential complication rate. Date of Surgery: 12/28/2023       Anesthesia: Choice, regional block    Procedures:  Procedure(s):  LEFT SHOULDER SCOPE MANIPULATION UNDER ANESTHESIA LYSIS OF ADHESIONS  [address]883 Arthroscopy, shoulder, surgical; with lysis and resection of adhesions, with or without manipulation. Shoulder joint injection, 40 mg/mL Depo-Medrol x2 cc    Indications / Consent:   The patient has had a frozen shoulder and refractory to non-operative measures. They have had significant decreased range of motion, it was deteremined that proceeding with capsular  release in an effort to improve their range of motion function and decreased their pain. After previous discussions and treatments using both conservative and/or non-operative treatment options the patient elected to proceed with surgery due to continued symptoms. A review of the risks and benefits, including but not limited to infection, stiffness, injury to nerves and vessels, DVT, PE, MI, need for further operations and other anesthesia related risks was performed with the patient. After this review and the review of the likely outcome and potential complications of the procedure, preoperative verbal and written consents were obtained. The operative procedure and postoperative course were discussed with the patient in detail and the extremity was marked by the patient and myself.

## 2023-12-29 ENCOUNTER — HOSPITAL ENCOUNTER (OUTPATIENT)
Dept: PHYSICAL THERAPY | Age: 43
Setting detail: RECURRING SERIES
Discharge: HOME | End: 2024-01-01
Attending: ORTHOPAEDIC SURGERY
Payer: COMMERCIAL

## 2023-12-29 PROCEDURE — 97110 THERAPEUTIC EXERCISES: CPT

## 2023-12-29 PROCEDURE — 97140 MANUAL THERAPY 1/> REGIONS: CPT

## 2023-12-29 PROCEDURE — 97016 VASOPNEUMATIC DEVICE THERAPY: CPT

## 2023-12-29 ASSESSMENT — PAIN SCALES - GENERAL: PAINLEVEL_OUTOF10: 0

## 2023-12-29 NOTE — PROGRESS NOTES
[facility]   1/9/2024  1:00 PM Monica Collins, PT [facility]   1/10/2024  1:50 PM RAHEL High MD [facility]   1/11/2024  1:00 PM Monica Collins, PT [facility]   1/12/2024  1:00 PM Monica Collins, PT [facility]   5/13/2024  7:30 AM PERIPHERAL [facility]   5/13/2024  8:00 AM Steph Renteria MD [facility]

## 2024-01-03 ENCOUNTER — HOSPITAL ENCOUNTER (OUTPATIENT)
Dept: PHYSICAL THERAPY | Age: 44
Setting detail: RECURRING SERIES
Discharge: HOME | End: 2024-01-06
Attending: ORTHOPAEDIC SURGERY
Payer: COMMERCIAL

## 2024-01-03 PROCEDURE — 97140 MANUAL THERAPY 1/> REGIONS: CPT

## 2024-01-03 PROCEDURE — 97110 THERAPEUTIC EXERCISES: CPT

## 2024-01-03 ASSESSMENT — PAIN SCALES - GENERAL: PAINLEVEL_OUTOF10: 0

## 2024-01-03 NOTE — PROGRESS NOTES
Sheila Ndiaye Steve  : 1980  Primary: Rich Robbins (Nile QUINTANILLA)  Secondary:  [facility] @ [place] [address]  [place] [address]  Phone: [phone]  Fax: [phone] Plan Frequency: 4 times a week until 2024, followed by twice a week until 3/27/2024    Plan of Care/Certification Expiration Date: 24      Plan of Care/Certification Expiration Date:  Plan of Care/Certification Expiration Date: 24    Frequency/Duration: Plan Frequency: 4 times a week until 2024, followed by twice a week until 3/27/2024      Time In/Out:   Time In: 1018  Time Out: 1057    PT Visit Info:    Plan Frequency: 4 times a week until 2024, followed by twice a week until 3/27/2024  Total # of Visits to Date: 29  Progress Note Counter: 1        Visit Count:  31     OUTPATIENT PHYSICAL THERAPY:OP NOTE TYPE: Treatment Note 2023       Charge Capture   Episode              Treatment Diagnosis:    Pain in left shoulder (M25.512)   Stiffness of left shoulder, not elsewhere classified (M25.612)     Medical/Referring Diagnosis:    Referring Physician:  RAHEL High MD MD Orders:  PT Eval and Treat   Return MD Appt:  2023  Date of Onset:  Onset Date: 23     Allergies:   Ciprofloxacin, Amoxicillin, and Penicillins  Restrictions/Precautions:    None  Interventions Planned See assessment note.    Subjective Assessment: Pt states she's not having any pain upon arrival to PT. She says she noticed a little pain when she was reversing in her car this morning.    Initial Pain Level:}    0/10  Post Session Pain Level:       0/10   Medications Last Reviewed:  1/3/2024  Updated Objective Findings:  None Today  Treatment   Therapeutic Exercise: (14 minutes): Exercises per grid below to improve mobility, strength, and dynamic movement of left shoulder to improve functional lifting, carrying, reaching, and overhead activites.  Required minimal visual, verbal, and tactile cues

## 2024-01-04 ENCOUNTER — HOSPITAL ENCOUNTER (OUTPATIENT)
Dept: PHYSICAL THERAPY | Age: 44
Setting detail: RECURRING SERIES
Discharge: HOME | End: 2024-01-07
Attending: ORTHOPAEDIC SURGERY
Payer: COMMERCIAL

## 2024-01-04 PROCEDURE — 97110 THERAPEUTIC EXERCISES: CPT

## 2024-01-04 PROCEDURE — 97140 MANUAL THERAPY 1/> REGIONS: CPT

## 2024-01-04 ASSESSMENT — PAIN SCALES - GENERAL: PAINLEVEL_OUTOF10: 0

## 2024-01-04 NOTE — PROGRESS NOTES
Required minimal visual, verbal, and tactile cues to promote proper body alignment, promote proper body posture, promote proper body mechanics, and promote proper body breathing techniques.  Progressed resistance, range, repetitions, and complexity of movement as indicated.                                   Most recent tx:   Date:  1/4/24 Date:  1/3/24 Date:  12/29/2023 Date:  11/20/2023 Date:  11/13/2023 Date:  11/6/2023   Activity/Exercise         NuStep         UBE L6 resistance for 2 minutes forward then 2 minutes backward  L6 resistance for 2 minutes forward then 2 minutes backward   L6 resistance for 4 minutes forward then 4 minutes backward with pt pushing more evenly with L L5.5 resistance for 4 minutes forward then 4 minutes backward with pt pushing more evenly with L L5 resistance for 4 minutes forward then 4 minutes backward with pt pushing more evenly with L   Pulleys* 2 minutes ea dir - flex + abd LUE Working into flexion and abduction with focus on LUE 25 reps into flexion then scaption with cues to relax L UE 25 reps into flexion then scaption with cues to relax L UE 25 reps into flexion then scaption with cues to relax L UE  Also performed 25 reps into IR  25 reps into flexion then scaption with cues to relax L UE  Also performed 25 reps into IR    Shoulder flexion AAROM at wall*         Self-mobilization into inferior L glenohumeral glide*         Shoulder isometric walkouts         Self-mobilization into posterior L glenohumeral glide         Finger ladder         Pectoral/bicep stretch at doorway         Shoulder extension AAROM with wand*         Standing shoulder rows*         Standing shoulder extension*         Standing shoulder ER/IR*         S/L shoulder ER 1x20 L        Varying resistance at each UE in multiple different planes of motion          L shoulder AROM      Per ROM assessment in chart   Passive PNF         Passive shoulder abduction stretch*         Prone shoulder extension*

## 2024-01-05 ENCOUNTER — HOSPITAL ENCOUNTER (OUTPATIENT)
Dept: PHYSICAL THERAPY | Age: 44
Setting detail: RECURRING SERIES
Discharge: HOME | End: 2024-01-08
Attending: ORTHOPAEDIC SURGERY
Payer: COMMERCIAL

## 2024-01-05 PROCEDURE — 97110 THERAPEUTIC EXERCISES: CPT
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
## 2024-01-08 ENCOUNTER — HOSPITAL ENCOUNTER (OUTPATIENT)
Dept: PHYSICAL THERAPY | Age: 44
Setting detail: RECURRING SERIES
Discharge: HOME | End: 2024-01-11
Attending: ORTHOPAEDIC SURGERY
Payer: COMMERCIAL

## 2024-01-08 PROCEDURE — 97110 THERAPEUTIC EXERCISES: CPT

## 2024-01-08 PROCEDURE — 97140 MANUAL THERAPY 1/> REGIONS: CPT

## 2024-01-08 NOTE — PROGRESS NOTES
Sheila Ndiaye Steve  : 1980  Primary: Rich Robbins (Nile QUINTANILLA)  Secondary:  [facility] @ [place] [address]  [place] [address]  Phone: [phone]  Fax: [phone] Plan Frequency: 4 times a week until 2024, followed by twice a week until 3/27/2024    Plan of Care/Certification Expiration Date: 24          Plan of Care/Certification Expiration Date:  Plan of Care/Certification Expiration Date: 24    Frequency/Duration: Plan Frequency: 4 times a week until 2024, followed by twice a week until 3/27/2024      Time In/Out:   Time In: 1302  Time Out: 1344    PT Visit Info:    Plan Frequency: 4 times a week until 2024, followed by twice a week until 3/27/2024  Total # of Visits to Date: 34  Progress Note Counter: 4        Visit Count:  34     OUTPATIENT PHYSICAL THERAPY:OP NOTE TYPE: Treatment Note 2023       Charge Capture   Episode              Treatment Diagnosis:    Pain in left shoulder (M25.512)   Stiffness of left shoulder, not elsewhere classified (M25.612)     Medical/Referring Diagnosis:    Referring Physician:  RAHEL High MD MD Orders:  PT Eval and Treat   Return MD Appt:  2023  Date of Onset:  Onset Date: 23     Allergies:   Ciprofloxacin, Amoxicillin, and Penicillins  Restrictions/Precautions:    None  Interventions Planned See assessment note.    Subjective Assessment: Pt states she was able to don her bra and then raise her L UE almost as far as her R UE.    Initial Pain Level:     (no pain reported)/10  Post Session Pain Level:        (no pain reported)/10     Medications Last Reviewed:  2024  Updated Objective Findings:  None Today    Treatment   Therapeutic Exercise: (24 minutes): Exercises per grid below to improve mobility, strength, and dynamic movement of left shoulder to improve functional lifting, carrying, reaching, and overhead activites.  Required minimal visual, verbal, and tactile cues to

## 2024-01-09 ENCOUNTER — HOSPITAL ENCOUNTER (OUTPATIENT)
Dept: PHYSICAL THERAPY | Age: 44
Setting detail: RECURRING SERIES
Discharge: HOME | End: 2024-01-12
Attending: ORTHOPAEDIC SURGERY
Payer: COMMERCIAL

## 2024-01-09 PROCEDURE — 97110 THERAPEUTIC EXERCISES: CPT

## 2024-01-09 PROCEDURE — 97140 MANUAL THERAPY 1/> REGIONS: CPT

## 2024-01-09 NOTE — PROGRESS NOTES
Sheila Ndiaye Steve  : 1980  Primary: Rich Robbins (Nile QUINTANILLA)  Secondary:  [facility] @ [place] [address] [place]  [place] [address]  Phone: [phone]  Fax: [phone] Plan Frequency: 4 times a week until 2024, followed by twice a week until 3/27/2024    Plan of Care/Certification Expiration Date: 24          Plan of Care/Certification Expiration Date:  Plan of Care/Certification Expiration Date: 24    Frequency/Duration: Plan Frequency: 4 times a week until 2024, followed by twice a week until 3/27/2024      Time In/Out:   Time In: 1301  Time Out: 1344    PT Visit Info:    Plan Frequency: 4 times a week until 2024, followed by twice a week until 3/27/2024  Total # of Visits to Date: 35  Progress Note Counter: 5        Visit Count:  35     OUTPATIENT PHYSICAL THERAPY:OP NOTE TYPE: Treatment Note 2023       Charge Capture   Episode              Treatment Diagnosis:    Pain in left shoulder (M25.512)   Stiffness of left shoulder, not elsewhere classified (M25.612)     Medical/Referring Diagnosis:    Referring Physician:  RAHEL High MD MD Orders:  PT Eval and Treat   Return MD Appt:  2023  Date of Onset:  Onset Date: 23     Allergies:   Ciprofloxacin, Amoxicillin, and Penicillins  Restrictions/Precautions:    None  Interventions Planned See assessment note.    Subjective Assessment: Pt states she was able to don her bra and then raise her L UE almost as far as her R UE.    Initial Pain Level:     (no pain verbalized)/10  Post Session Pain Level:        (no pain verbalized)/10     Medications Last Reviewed:  2024  Updated Objective Findings:  None Today    Treatment   Therapeutic Exercise: (26 minutes): Exercises per grid below to improve mobility, strength, and dynamic movement of left shoulder to improve functional lifting, carrying, reaching, and overhead activites.  Required minimal visual, verbal, and tactile cues to

## 2024-01-10 ENCOUNTER — OFFICE VISIT (OUTPATIENT)
Dept: ORTHOPEDIC SURGERY | Age: 44
End: 2024-01-10

## 2024-01-10 DIAGNOSIS — M25.512 LEFT SHOULDER PAIN, UNSPECIFIED CHRONICITY: Primary | ICD-10-CM

## 2024-01-10 DIAGNOSIS — Z09 SURGERY FOLLOW-UP: ICD-10-CM

## 2024-01-10 DIAGNOSIS — M75.02 ADHESIVE CAPSULITIS OF LEFT SHOULDER: ICD-10-CM

## 2024-01-10 PROCEDURE — 99024 POSTOP FOLLOW-UP VISIT: CPT | Performed by: ORTHOPAEDIC SURGERY

## 2024-01-10 NOTE — PROGRESS NOTES
.    Name: Sheila Wilson  YOB: 1980  Gender: female  MRN: [id number]    CC:   Chief Complaint   Patient presents with    Follow-up     1st P/O left shoulder scope ELLEN/CHRISTIE, CSI DOS 12-28-23       Left Shoulder Scope Manipulation Under Anesthesia Lysis Of Adhesions - Left  12/28/2023    HPI: Patient returns today after manipulation and lysis of adhesions. Doing well.     Allergies   Allergen Reactions    Ciprofloxacin Other (See Comments)     Leg numbness  Loss of sensation in side of both legs  10 yrs ago    Amoxicillin Rash    Penicillins Hives and Rash     Past Medical History:   Diagnosis Date    COVID 2021    not hospitalized    COVID 11/2022    not hospitalized     Past Surgical History:   Procedure Laterality Date    BICEPS TENDON REPAIR Left 8/3/2023    OPEN BICEPS TENODESIS performed by RAHEL High MD at [facility]    BREAST SURGERY Left 8/24/2022    BREAST LESION BIOPSY EXCISION performed by Sanjuana Gilbert DO at [facility]    SHOULDER ARTHROSCOPY Left 8/3/2023    LEFT SHOULDER SCOPE DEBRIDEMENT performed by RAHEL High MD at [facility]    SHOULDER ARTHROSCOPY Left 12/28/2023    LEFT SHOULDER SCOPE MANIPULATION UNDER ANESTHESIA LYSIS OF ADHESIONS performed by RAHEL High MD at [facility]    WISDOM TOOTH EXTRACTION       Family History   Problem Relation Age of Onset    Dementia Mother 76    Breast Cancer Mother 42    Arthritis Mother         rheumatoid arthritis    Cancer Mother         Breast cancer at 42    Hearing Loss Mother         Her hearing loss was triggered by her pregnancy with me.    Other Mother         Dementia    Retinal Detachment Mother         repair    Other Sister         diverticulitis    Gout Brother     Alcohol Abuse Maternal Grandmother     Alcohol Abuse Maternal Grandfather     Ovarian Cancer Neg Hx     Prostate Cancer Neg Hx     Deep Vein Thrombosis Neg Hx     Pulmonary Embolism Neg Hx      Social History     Socioeconomic History    Marital status: Single     17-Aug-2020

## 2024-01-11 ENCOUNTER — HOSPITAL ENCOUNTER (OUTPATIENT)
Dept: PHYSICAL THERAPY | Age: 44
Setting detail: RECURRING SERIES
Discharge: HOME | End: 2024-01-14
Attending: ORTHOPAEDIC SURGERY
Payer: COMMERCIAL

## 2024-01-11 PROCEDURE — 97140 MANUAL THERAPY 1/> REGIONS: CPT

## 2024-01-11 PROCEDURE — 97110 THERAPEUTIC EXERCISES: CPT

## 2024-01-11 ASSESSMENT — PAIN SCALES - GENERAL: PAINLEVEL_OUTOF10: 0

## 2024-01-11 NOTE — PROGRESS NOTES
Sheila Ndiaye Steve  : 1980  Primary: Rich Robbins (Nile QUINTANILLA)  Secondary:  [facility] @ [place] [address] [place]  [place] [address]  Phone: [phone]  Fax: [phone] Plan Frequency: 4 times a week until 2024, followed by twice a week until 3/27/2024    Plan of Care/Certification Expiration Date: 24          Plan of Care/Certification Expiration Date:  Plan of Care/Certification Expiration Date: 24    Frequency/Duration: Plan Frequency: 4 times a week until 2024, followed by twice a week until 3/27/2024      Time In/Out:   Time In: 1301  Time Out: 1345    PT Visit Info:    Plan Frequency: 4 times a week until 2024, followed by twice a week until 3/27/2024  Total # of Visits to Date: 36  Progress Note Counter: 6        Visit Count:  36     OUTPATIENT PHYSICAL THERAPY:OP NOTE TYPE: Treatment Note 2023       Charge Capture   Episode              Treatment Diagnosis:    Pain in left shoulder (M25.512)   Stiffness of left shoulder, not elsewhere classified (M25.612)     Medical/Referring Diagnosis:    Referring Physician:  RAHEL High MD MD Orders:  PT Eval and Treat   Return MD Appt:  2023  Date of Onset:  Onset Date: 23     Allergies:   Ciprofloxacin, Amoxicillin, and Penicillins  Restrictions/Precautions:    None  Interventions Planned See assessment note.    Subjective Assessment: Pt states no pain for the most part, but every now and then she has pulsing pain in her L anterior shoulder. She saw Dr. High yesterday and he was happy with her movement and took the stitches out.    Initial Pain Level:    0/10  Post Session Pain Level:       0/10     Medications Last Reviewed:  2024  Updated Objective Findings:  None Today    Treatment   Therapeutic Exercise: (23 minutes): Exercises per grid below to improve mobility, strength, and dynamic movement of left shoulder to improve functional lifting, carrying, reaching, and

## 2024-01-12 ENCOUNTER — HOSPITAL ENCOUNTER (OUTPATIENT)
Dept: PHYSICAL THERAPY | Age: 44
Setting detail: RECURRING SERIES
Discharge: HOME | End: 2024-01-15
Attending: ORTHOPAEDIC SURGERY
Payer: COMMERCIAL

## 2024-01-12 PROCEDURE — 97110 THERAPEUTIC EXERCISES: CPT

## 2024-01-12 NOTE — PROGRESS NOTES
reps into IR    Shoulder flexion AAROM at wall*              Self-mobilization into inferior L glenohumeral glide*              Shoulder isometric walkouts              Self-mobilization into posterior L glenohumeral glide              Finger ladder              Pectoral/bicep stretch at doorway              Shoulder extension AAROM with wand*    20 reps/1 set with cues to bring both arms back for additional shoulder extension          Standing shoulder rows* 17.5 lb resistance at cable machine; 20 reps/1 set with cues for slow return             Standing shoulder extension* 12.5 lb resistance at cable machine; 20 reps/1 set             Standing shoulder ER/IR*              S/L shoulder ER   2 lb dumbbell; 20 reps/1 set L UE  1x20 L         Varying resistance at each UE in multiple different planes of motion               L shoulder AROM          Per ROM assessment in chart    Passive PNF              Passive shoulder abduction stretch*              Prone shoulder flexion   20 reps/1 set L UE with cues to externally rotate 20 reps/1 set L UE with cues to externally rotate          Prone shoulder extension*   2 lb dumbbell; 20 reps/1 set L UE           Prone shoulder Ts*   20 reps/1 set L UE (attempted weight initially but had to go to no weight due to pt not moving correctly)           Prone shoulder rows*              TRX hangs with shoulder flexion        10 reps with 10 second holds      Passive shoulder flexion with bar at cable machine 55 lbs; 5 reps/1 set with 20-30 second holds; cues to hold bar then sit down and lean forward slightly for stretch and traction at L shoulder       25 lb resistance; therapist assisting pt to pull bar toward her arms, then instructing her to relax both arms/shoulders and lean forward slightly for flexion/distraction at L shoulder; 4 reps/1 set with 20-30 second holds      Thread the needle in quadruped 20 reps/1 set each UE             Quadruped rocking into shoulder flexion 
Radha

## 2024-01-15 ENCOUNTER — HOSPITAL ENCOUNTER (OUTPATIENT)
Dept: PHYSICAL THERAPY | Age: 44
Setting detail: RECURRING SERIES
Discharge: HOME | End: 2024-01-18
Attending: ORTHOPAEDIC SURGERY
Payer: COMMERCIAL

## 2024-01-15 PROCEDURE — 97110 THERAPEUTIC EXERCISES: CPT

## 2024-01-15 PROCEDURE — 97140 MANUAL THERAPY 1/> REGIONS: CPT
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
## 2024-01-15 NOTE — PROGRESS NOTES
Sheila Ndiaye Steve  : 1980  Primary: Rich Robbins (Nile QUINTANILLA)  Secondary:  [facility] @ [place] [address] [place]  [place] [address]  Phone: [phone]  Fax: [phone] Plan Frequency: 4 times a week until 2024, followed by twice a week until 3/27/2024    Plan of Care/Certification Expiration Date: 24          Plan of Care/Certification Expiration Date:  Plan of Care/Certification Expiration Date: 24    Frequency/Duration: Plan Frequency: 4 times a week until 2024, followed by twice a week until 3/27/2024      Time In/Out:   Time In: 1345  Time Out: 1429    PT Visit Info:    Plan Frequency: 4 times a week until 2024, followed by twice a week until 3/27/2024  Total # of Visits to Date: 38  Progress Note Counter: 8        Visit Count:  38     OUTPATIENT PHYSICAL THERAPY:OP NOTE TYPE: Treatment Note 2023       Charge Capture   Episode              Treatment Diagnosis:    Pain in left shoulder (M25.512)   Stiffness of left shoulder, not elsewhere classified (M25.612)     Medical/Referring Diagnosis:    Referring Physician:  RAHEL High MD MD Orders:  PT Eval and Treat   Return MD Appt:  2023  Date of Onset:  Onset Date: 23     Allergies:   Ciprofloxacin, Amoxicillin, and Penicillins  Restrictions/Precautions:    None  Interventions Planned See assessment note.    Subjective Assessment: Pt stating no specific pain this session. States movement is easier overall. States she has not gotten a chance to try swimming yet, but is planning on doing this soon.    Initial Pain Level:     (no pain stated)/10  Post Session Pain Level:        (no pain stated)/10     Medications Last Reviewed:  1/15/2024  Updated Objective Findings:   see progress note on 1/15/2024    Treatment   Therapeutic Exercise: (30 minutes): Exercises per grid below (and assessment in chart on 1/15/2024) to improve mobility, strength, and dynamic movement of left

## 2024-01-17 ENCOUNTER — HOSPITAL ENCOUNTER (OUTPATIENT)
Dept: PHYSICAL THERAPY | Age: 44
Setting detail: RECURRING SERIES
Discharge: HOME | End: 2024-01-20
Attending: ORTHOPAEDIC SURGERY
Payer: COMMERCIAL

## 2024-01-17 PROCEDURE — 97110 THERAPEUTIC EXERCISES: CPT

## 2024-01-17 ASSESSMENT — PAIN SCALES - GENERAL: PAINLEVEL_OUTOF10: 0

## 2024-01-17 NOTE — PROGRESS NOTES
activites.  Required minimal visual, verbal, and tactile cues to promote proper body alignment, promote proper body posture, promote proper body mechanics, and promote proper body breathing techniques.  Progressed resistance, range, repetitions, and complexity of movement as indicated.                                   Most recent tx:   Date:  1/17/2024 Date:  1/15/2024 Date:  1/12/2024 Date:  1/11/2024   Activity/Exercise       NuStep       UBE L6 resistance for 3 minutes forward then 3 minutes backward  L5.5 resistance for 4 minutes forward then 4 minutes backward  L5 resistance for 4 minutes forward then 4 minutes backward  L4 resistance for 4 minutes forward then 4 minutes backward    Pulleys* 20 reps/1 set into flexion then abduction  2 minutes ea dir - flex then abd L UE  1 minute shoulder IR L UE 2 minutes ea dir - flex then abd L UE  1 minute shoulder IR L UE   Shoulder flexion AAROM at wall*       Self-mobilization into inferior L glenohumeral glide*       Shoulder isometric walkouts       Self-mobilization into posterior L glenohumeral glide       Finger ladder       Pectoral/bicep stretch at doorway       Supine shoulder PNF Small red weighted ball; D2 flexion/extension 20 reps/1 set with cues for correct movement pattern      Sidelying open book exercise 20 reps/1 set with cues to look at moving hand for increased stretch      Shoulder extension AAROM with wand*       Standing shoulder rows* 17.5 lb resistance at cable machine; 20 reps/1 set with cues for slow return  17.5 lb resistance at cable machine; 20 reps/1 set with cues for slow return    Standing shoulder extension* 12.5 lb resistance at cable machine; 20 reps/1 set; cues to avoid forward flexion  12.5 lb resistance at cable machine; 20 reps/1 set    Standing shoulder ER/IR*       S/L shoulder ER       Varying resistance at each UE in multiple different planes of motion   Per strength assessment in chart     L shoulder AROM  Per ROM assessment

## 2024-01-23 ENCOUNTER — HOSPITAL ENCOUNTER (OUTPATIENT)
Dept: PHYSICAL THERAPY | Age: 44
Setting detail: RECURRING SERIES
Discharge: HOME | End: 2024-01-26
Attending: ORTHOPAEDIC SURGERY
Payer: COMMERCIAL

## 2024-01-23 PROCEDURE — 97110 THERAPEUTIC EXERCISES: CPT

## 2024-01-23 ASSESSMENT — PAIN SCALES - GENERAL: PAINLEVEL_OUTOF10: 0

## 2024-01-23 NOTE — PROGRESS NOTES
Sheila Ndiaye Steve  : 1980  Primary: Rich Robbins (Nile QUINTANILLA)  Secondary:  [facility] @ [place] [address]  [place] [address]  Phone: [phone]  Fax: [phone] Plan Frequency: 4 times a week until 2024, followed by twice a week until 3/27/2024    Plan of Care/Certification Expiration Date: 24          Plan of Care/Certification Expiration Date:  Plan of Care/Certification Expiration Date: 24    Frequency/Duration: Plan Frequency: 4 times a week until 2024, followed by twice a week until 3/27/2024      Time In/Out:   Time In: 1430  Time Out: 1519      PT Visit Info:    Plan Frequency: 4 times a week until 2024, followed by twice a week until 3/27/2024  Total # of Visits to Date: 40  Progress Note Counter: 2        Visit Count:  40     OUTPATIENT PHYSICAL THERAPY:OP NOTE TYPE: Treatment Note 2023       Charge Capture   Episode              Treatment Diagnosis:    Pain in left shoulder (M25.512)   Stiffness of left shoulder, not elsewhere classified (M25.612)     Medical/Referring Diagnosis:    Referring Physician:  RAHEL High MD MD Orders:  PT Eval and Treat   Return MD Appt:  2023  Date of Onset:  Onset Date: 23     Allergies:   Ciprofloxacin, Amoxicillin, and Penicillins  Restrictions/Precautions:    None  Interventions Planned See assessment note.    Subjective Assessment: she signed up for 2 sprint triathlon's in  - mix of swim and bike.    Initial Pain Level:    0 (more of a mms soreness/ache)/10  Post Session Pain Level:       0/10     Medications Last Reviewed:  2024  Updated Objective Findings:  None Today    Treatment   Therapeutic Exercise: (45 minutes): Exercises per grid below to improve mobility, strength, and dynamic movement of left shoulder to improve functional lifting, carrying, reaching, and overhead activites.  Required minimal visual, verbal, and tactile cues to promote proper body

## 2024-01-25 ENCOUNTER — HOSPITAL ENCOUNTER (OUTPATIENT)
Dept: PHYSICAL THERAPY | Age: 44
Setting detail: RECURRING SERIES
Discharge: HOME | End: 2024-01-28
Attending: ORTHOPAEDIC SURGERY
Payer: COMMERCIAL

## 2024-01-25 PROCEDURE — 97110 THERAPEUTIC EXERCISES: CPT

## 2024-01-25 ASSESSMENT — PAIN SCALES - GENERAL: PAINLEVEL_OUTOF10: 0

## 2024-01-25 NOTE — PROGRESS NOTES
Sheila Ndiaye Steve  : 1980  Primary: Rich Robbins (Nile QUINTANILLA)  Secondary:  [facility] @ [place] [address]  [place] [address]  Phone: [phone]  Fax: [phone] Plan Frequency: 4 times a week until 2024, followed by twice a week until 3/27/2024    Plan of Care/Certification Expiration Date: 24          Plan of Care/Certification Expiration Date:  Plan of Care/Certification Expiration Date: 24    Frequency/Duration: Plan Frequency: 4 times a week until 2024, followed by twice a week until 3/27/2024      Time In/Out:   Time In: 1346  Time Out: 1428      PT Visit Info:    Plan Frequency: 4 times a week until 2024, followed by twice a week until 3/27/2024  Total # of Visits to Date: 41  Progress Note Counter: 3        Visit Count:  41     OUTPATIENT PHYSICAL THERAPY:OP NOTE TYPE: Treatment Note 2023       Charge Capture   Episode              Treatment Diagnosis:    Pain in left shoulder (M25.512)   Stiffness of left shoulder, not elsewhere classified (M25.612)     Medical/Referring Diagnosis:    Referring Physician:  RAHEL High MD MD Orders:  PT Eval and Treat   Return MD Appt:  2023  Date of Onset:  Onset Date: 23     Allergies:   Ciprofloxacin, Amoxicillin, and Penicillins  Restrictions/Precautions:    None  Interventions Planned See assessment note.    Subjective Assessment: Pt stating she did good after all the strengthening exercises last session.    Initial Pain Level:    0/10  Post Session Pain Level:       0/10     Medications Last Reviewed:  2024  Updated Objective Findings:  None Today    Treatment   Therapeutic Exercise: (41 minutes): Exercises per grid below to improve mobility, strength, and dynamic movement of left shoulder to improve functional lifting, carrying, reaching, and overhead activites.  Required minimal visual, verbal, and tactile cues to promote proper body alignment, promote proper body

## 2024-01-26 PROBLEM — Z09 SURGERY FOLLOW-UP: Status: RESOLVED | Noted: 2023-12-05 | Resolved: 2024-01-26

## 2024-01-30 ENCOUNTER — HOSPITAL ENCOUNTER (OUTPATIENT)
Dept: PHYSICAL THERAPY | Age: 44
Setting detail: RECURRING SERIES
Discharge: HOME | End: 2024-02-02
Attending: ORTHOPAEDIC SURGERY
Payer: COMMERCIAL

## 2024-01-30 PROCEDURE — 97110 THERAPEUTIC EXERCISES: CPT

## 2024-01-30 NOTE — PROGRESS NOTES
Sheila Ndiaye Steve  : 1980  Primary: Rich Robbins (Nile QUINTANILLA)  Secondary:  [facility] @ [place] [address]  [place] [address]  Phone: [phone]  Fax: [phone] Plan Frequency: 4 times a week until 2024, followed by twice a week until 3/27/2024    Plan of Care/Certification Expiration Date: 24          Plan of Care/Certification Expiration Date:  Plan of Care/Certification Expiration Date: 24    Frequency/Duration: Plan Frequency: 4 times a week until 2024, followed by twice a week until 3/27/2024      Time In/Out:   Time In: 801  Time Out: 841      PT Visit Info:    Plan Frequency: 4 times a week until 2024, followed by twice a week until 3/27/2024  Total # of Visits to Date: 41  Progress Note Counter: 3        Visit Count:  42     OUTPATIENT PHYSICAL THERAPY:OP NOTE TYPE: Treatment Note 2023       Charge Capture   Episode              Treatment Diagnosis:    Pain in left shoulder (M25.512)   Stiffness of left shoulder, not elsewhere classified (M25.612)     Medical/Referring Diagnosis:    Referring Physician:  RAHEL High MD MD Orders:  PT Eval and Treat   Return MD Appt:  2023  Date of Onset:  Onset Date: 23     Allergies:   Ciprofloxacin, Amoxicillin, and Penicillins  Restrictions/Precautions:    None  Interventions Planned See assessment note.    Subjective Assessment: Pt reports more LUE shldr weakness than pain     Initial Pain Level:     /10  Post Session Pain Level:        /10     Medications Last Reviewed:  2024  Updated Objective Findings:  None Today    Treatment   Therapeutic Exercise: (38 minutes): Exercises per grid below to improve mobility, strength, and dynamic movement of left shoulder to improve functional lifting, carrying, reaching, and overhead activites.  Required minimal visual, verbal, and tactile cues to promote proper body alignment, promote proper body posture, promote proper body

## 2024-01-31 ENCOUNTER — APPOINTMENT (OUTPATIENT)
Dept: PHYSICAL THERAPY | Age: 44
End: 2024-01-31
Attending: ORTHOPAEDIC SURGERY
Payer: COMMERCIAL

## 2024-02-02 ENCOUNTER — HOSPITAL ENCOUNTER (OUTPATIENT)
Dept: PHYSICAL THERAPY | Age: 44
Setting detail: RECURRING SERIES
Discharge: HOME | End: 2024-02-05
Attending: ORTHOPAEDIC SURGERY
Payer: COMMERCIAL

## 2024-02-02 PROCEDURE — 97110 THERAPEUTIC EXERCISES: CPT

## 2024-02-02 ASSESSMENT — PAIN SCALES - GENERAL: PAINLEVEL_OUTOF10: 0

## 2024-02-02 NOTE — PROGRESS NOTES
Sheila Ndiaye Steve  : 1980  Primary: Rich Robbins (Nile QUINTANILLA)  Secondary:  [facility] @ [place] [address]  [place] [address]  Phone: [phone]  Fax: [phone] Plan Frequency: 4 times a week until 2024, followed by twice a week until 3/27/2024    Plan of Care/Certification Expiration Date: 24          Plan of Care/Certification Expiration Date:  Plan of Care/Certification Expiration Date: 24    Frequency/Duration: Plan Frequency: 4 times a week until 2024, followed by twice a week until 3/27/2024      Time In/Out:   Time In: 1302  Time Out: 1345      PT Visit Info:    Plan Frequency: 4 times a week until 2024, followed by twice a week until 3/27/2024  Total # of Visits to Date: 43  Progress Note Counter: 5        Visit Count:  43     OUTPATIENT PHYSICAL THERAPY:OP NOTE TYPE: Treatment Note 2023       Charge Capture   Episode              Treatment Diagnosis:    Pain in left shoulder (M25.512)   Stiffness of left shoulder, not elsewhere classified (M25.612)     Medical/Referring Diagnosis:    Referring Physician:  RAHEL High MD MD Orders:  PT Eval and Treat   Return MD Appt:  2023  Date of Onset:  Onset Date: 23     Allergies:   Ciprofloxacin, Amoxicillin, and Penicillins  Restrictions/Precautions:    None  Interventions Planned See assessment note.    Subjective Assessment: Pt states she really does not have too much pain, just weakness still. States she is signing up for a marathon    Initial Pain Level:    0/10  Post Session Pain Level:       0/10     Medications Last Reviewed:  2024  Updated Objective Findings:  None Today    Treatment   Therapeutic Exercise: (40 minutes): Exercises per grid below to improve mobility, strength, and dynamic movement of left shoulder to improve functional lifting, carrying, reaching, and overhead activites.  Required minimal visual, verbal, and tactile cues to promote proper

## 2024-02-06 ENCOUNTER — HOSPITAL ENCOUNTER (OUTPATIENT)
Dept: PHYSICAL THERAPY | Age: 44
Setting detail: RECURRING SERIES
Discharge: HOME | End: 2024-02-09
Attending: ORTHOPAEDIC SURGERY
Payer: COMMERCIAL

## 2024-02-06 PROCEDURE — 97110 THERAPEUTIC EXERCISES: CPT

## 2024-02-06 ASSESSMENT — PAIN SCALES - GENERAL: PAINLEVEL_OUTOF10: 0

## 2024-02-06 NOTE — PROGRESS NOTES
Sheila Wilson  : 1980  Primary: Rich Robbins (Nile QUINTANILLA)  Secondary:  [facility] @ [place] [address]  [place] [address]  Phone: [phone]  Fax: [phone] Plan Frequency: 4 times a week until 2024, followed by twice a week until 3/27/2024    Plan of Care/Certification Expiration Date: 24          Plan of Care/Certification Expiration Date:  Plan of Care/Certification Expiration Date: 24    Frequency/Duration: Plan Frequency: 4 times a week until 2024, followed by twice a week until 3/27/2024      Time In/Out:   Time In: 1111  Time Out: 1152      PT Visit Info:    Plan Frequency: 4 times a week until 2024, followed by twice a week until 3/27/2024  Total # of Visits to Date: 44  Progress Note Counter: 6        Visit Count:  44     OUTPATIENT PHYSICAL THERAPY:OP NOTE TYPE: Treatment Note 2023       Charge Capture   Episode              Treatment Diagnosis:    Pain in left shoulder (M25.512)   Stiffness of left shoulder, not elsewhere classified (M25.612)     Medical/Referring Diagnosis:    Referring Physician:  RAHEL High MD MD Orders:  PT Eval and Treat   Return MD Appt:  2023  Date of Onset:  Onset Date: 23     Allergies:   Ciprofloxacin, Amoxicillin, and Penicillins  Restrictions/Precautions:    None  Interventions Planned See assessment note.    Subjective Assessment: Pt calling ahead to appointment and stating she forgot about 11 appointment (thought it was at 1:00) - walking in the door at 11:11. Pt states her mom has not been doing well (dementia). She is hoping Dr. High will say she can discharge.    Initial Pain Level:    0/10  Post Session Pain Level:       0/10     Medications Last Reviewed:  2024  Updated Objective Findings:   Per grid below on 2024:  Initial measurement: (on 2023) Initial measurement: (on 2023) Reassessment (on 2023) Reassessment (on 1/15/2024) Reassessment (on

## 2024-02-07 ENCOUNTER — OFFICE VISIT (OUTPATIENT)
Dept: ORTHOPEDIC SURGERY | Age: 44
End: 2024-02-07

## 2024-02-07 DIAGNOSIS — Z09 SURGERY FOLLOW-UP: ICD-10-CM

## 2024-02-07 DIAGNOSIS — M75.02 ADHESIVE CAPSULITIS OF LEFT SHOULDER: ICD-10-CM

## 2024-02-07 DIAGNOSIS — M25.512 LEFT SHOULDER PAIN, UNSPECIFIED CHRONICITY: Primary | ICD-10-CM

## 2024-02-07 PROCEDURE — 99024 POSTOP FOLLOW-UP VISIT: CPT | Performed by: ORTHOPAEDIC SURGERY

## 2024-02-07 NOTE — PROGRESS NOTES
Systems  Noncontributory     PE: The wounds are clean, dry and intact, with no sign of infection. The skin is warm to the touch and they are neurovascularly intact.   Passive Motion in Forward Elevation is 165.    Passive Abduction is 135.  Passive External Rotation is 30  Strength is great throughout.  Only mild endrange tightness on the left compared to the right.      A/P:    ICD-10-CM    1. Left shoulder pain, unspecified chronicity  M25.512       2. Adhesive capsulitis of left shoulder  M75.02       3. Surgery follow-up  Z09         Continue with home stretching program.  I do not think she needs any more formal physical therapy.  She may follow-up as needed.  She has done really well with her progress since her manipulation.    Return if symptoms worsen or fail to improve, for Global slot on Hao's schedule.     Gary High MD  02/07/24

## 2024-02-09 ENCOUNTER — HOSPITAL ENCOUNTER (OUTPATIENT)
Dept: PHYSICAL THERAPY | Age: 44
Setting detail: RECURRING SERIES
Discharge: HOME | End: 2024-02-12
Attending: ORTHOPAEDIC SURGERY
Payer: COMMERCIAL

## 2024-02-09 PROCEDURE — 97110 THERAPEUTIC EXERCISES: CPT

## 2024-02-09 NOTE — PROGRESS NOTES
Reassessment (on 2/9/2024)    L UE: R UE: L UE: R UE: L UE: R UE:   Shoulder flexion  NT  4+/5  3-/5 4+/5 4-/5 4+/5   Shoulder abduction NT  4/5  3-/5 4+/5 3-/5 4+/5   Shoulder extension NT  5/5  4+/5 5/5 5/5 5/5   Shoulder adduction NT  5/5  4/5 5/5 4+/5 5/5   Shoulder IR  NT  5/5  4/5 5/5 4+/5 5/5   Shoulder ER NT  4/5  4-/5 4+/5 4/5 4+/5   Elbow flexion NT  5/5  5/5 5/5 5/5 5/5   Elbow extension NT  5/5  4+/5 5/5 5/5 5/5   Wrist flexion  NT  5/5  5/5 5/5 5/5 5/5   Wrist extension NT  5/5  5/5 5/5 5/5 5/5        ASSESSMENT   REASSESSMENT:  Sheila Wilson has now attended 45 physical therapy sessions for L shoulder pain/stiffness following L shoulder surgery on 8/3/2023 (details in history above). Since her L shoulder scope manipulation under anesthesia with lysis of adhesions on 12/28/2023, she has demonstrated significantly  improved L shoulder mobility, improved L UE strength/endurance, and improved functional mobility as evident by a score of 11/55 on the Disabilities of the Arm, Shoulder, and Hand Questionnaire (initial score of 43/55, with higher scores indicating increased disability). She has now met 4 out of 4 her short term goals and 4 out of 5 of her discharge goals (one goal not met yet due to decreased endurance). Pt states she is ready for discharge at this time.  Problem List: (Impacting functional limitations):    Increased Pain, Decreased Strength, Decreased ROM, Decreased Functional Mobility, Decreased [place] with Home Exercise Program, Decreased Posture, Decreased Body Mechanics, Decreased Activity Tolerance/Endurance*, Decreased Tolerance to Work Activity, Decreased High-Level IADLs, and Edema/Girth     Therapy Prognosis:   Therapy Prognosis: Good       Initial Assessment Complexity:   Decision Making: Medium Complexity      PLAN   Effective Dates: 11/6/2023 TO Plan of Care/Certification Expiration Date: 03/27/24     Frequency/Duration: Plan Frequency: 4 times a week until 1/12/2024, 
lb dumbbell; 30 reps/1 set with L UE with cues to maximize AROM       Supine shoulder IR at 90 degrees abduction 2 lb dumbbell; 20 reps/1 set with cues to maximize AROM       Scapular protraction (\"plusses\") Red theratubing resistance; 20 reps/1 set with cues to avoid elbow flexion       Chest fly in standing Red theratubing resistance; 10 reps/1 set        Scaption raises with DB     2 lb dumbbells; 20 reps/1 set with cues for correct technique   Shoulder Abd in Side lying*        Wall Slides*        Supine Shoulder Flex*        *given in HEP  Visual Unity Portal [id number]  Pt given following band colors: [x] Yellow          [x] Red          [] Green          [x] Blue          [] Grey        Treatment/Session Summary:    Treatment Assessment: Discharge note today - see assessment in chart.    Communication/Consultation:  None today  Equipment provided today:  None    Total Treatment Billable Duration: 40 minutes  Time In: 1301  Time Out: 1346    Monica Collins, PT, DPT       Charge Capture  Visual [employer] Portal  Appt Desk     Future Appointments   Date Time Provider Department Center   5/13/2024  7:30 AM PERIPHERAL [facility]   5/13/2024  8:00 AM Steph Renteria MD [facility]

## 2024-05-07 DIAGNOSIS — N60.99 BENIGN MAMMARY DYSPLASIA, UNSPECIFIED LATERALITY: Primary | ICD-10-CM

## 2024-05-07 NOTE — PROGRESS NOTES
[facility] Hematology and Oncology: Established patient - follow up     Chief Complaint   Patient presents with    Follow-up     Reason for Referral: Atypical hyperplasia of left breast  Referring Provider: Sanjuana Gilbert DO  Primary Care Provider: Lucio Nayak MD  Family History of Cancer/Hematologic Disorders: Family history is significant for mother with breast cancer diagnosed at 42 years old and unspecified family history of colon cancer.   Presenting Symptoms: Bloody left nipple discharge    History of Present Illness:  Ms. Wilson is a 43 y.o. female who presents today for follow up regarding atypical hyperplasia of left breast.  She initially presented for a diagnostic bilateral mammogram and left breast ultrasound on 6/21/22 after noticing one week of spontaneous bloody left nipple discharge.  There were no imaging findings suspicious for malignancy. Follow-up evaluation with a breast MRI was recommended because of the dense breast tissue, family history of breast cancer, and pathologic nipple discharge.  MRI of the bilateral breasts was completed on 6/28/22 with no evidence of malignancy in either breast and no significant ductal distention.  Radiologist suggested considering surgical referral if bloody nipple discharge persisted.  Patient was referred to Surgery and evaluated in consultation by Surgeon, Dr. Gilbert, on 8/4/22 with persistent bloody left nipple discharge.  Patient ultimately decided to undergo left breast duct excision, and she was taken to the OR on 8/24/22.  Final pathology from the left breast duct revealed fibrocystic mastopathy having duct ectasia in association with moderate intraductal hyperplasia.  Ms. Wilson is recovering well postoperatively and is now referred to [facility] for oncology evaluation and possible endocrine therapy.   - At consultation, we discussed the pathophysiology of breast cancer, and pre-cancerous pathology using visual aides.  We then reviewed her medical

## 2024-05-09 ENCOUNTER — OFFICE VISIT (OUTPATIENT)
Dept: OBGYN CLINIC | Age: 44
End: 2024-05-09

## 2024-05-09 VITALS
HEIGHT: 67 IN | SYSTOLIC BLOOD PRESSURE: 122 MMHG | BODY MASS INDEX: 22.29 KG/M2 | DIASTOLIC BLOOD PRESSURE: 68 MMHG | WEIGHT: 142 LBS

## 2024-05-09 DIAGNOSIS — Z12.4 SCREENING FOR CERVICAL CANCER: ICD-10-CM

## 2024-05-09 DIAGNOSIS — Z11.51 SCREENING FOR HUMAN PAPILLOMAVIRUS (HPV): ICD-10-CM

## 2024-05-09 DIAGNOSIS — R87.810 CERVICAL HIGH RISK HPV (HUMAN PAPILLOMAVIRUS) TEST POSITIVE: ICD-10-CM

## 2024-05-09 DIAGNOSIS — N60.92 INTRADUCTAL HYPERPLASIA WITHOUT ATYPIA OF LEFT BREAST: ICD-10-CM

## 2024-05-09 DIAGNOSIS — Z12.31 ENCOUNTER FOR SCREENING MAMMOGRAM FOR MALIGNANT NEOPLASM OF BREAST: ICD-10-CM

## 2024-05-09 DIAGNOSIS — Z11.3 SCREENING FOR STD (SEXUALLY TRANSMITTED DISEASE): ICD-10-CM

## 2024-05-09 DIAGNOSIS — Z01.419 WELL WOMAN EXAM WITH ROUTINE GYNECOLOGICAL EXAM: Primary | ICD-10-CM

## 2024-05-09 DIAGNOSIS — Z30.014 ENCOUNTER FOR INITIAL PRESCRIPTION OF INTRAUTERINE CONTRACEPTIVE DEVICE (IUD): ICD-10-CM

## 2024-05-09 RX ORDER — ESCITALOPRAM OXALATE 10 MG/1
TABLET ORAL
COMMUNITY
Start: 2024-05-05

## 2024-05-13 ENCOUNTER — OFFICE VISIT (OUTPATIENT)
Dept: ONCOLOGY | Age: 44
End: 2024-05-13
Payer: COMMERCIAL

## 2024-05-13 ENCOUNTER — HOSPITAL ENCOUNTER (OUTPATIENT)
Dept: LAB | Age: 44
Discharge: HOME | End: 2024-05-16
Payer: COMMERCIAL

## 2024-05-13 VITALS
DIASTOLIC BLOOD PRESSURE: 62 MMHG | SYSTOLIC BLOOD PRESSURE: 104 MMHG | HEIGHT: 68 IN | BODY MASS INDEX: 21.22 KG/M2 | WEIGHT: 140 LBS | TEMPERATURE: 98.2 F | HEART RATE: 53 BPM | RESPIRATION RATE: 16 BRPM | OXYGEN SATURATION: 97 %

## 2024-05-13 DIAGNOSIS — N60.99 DUCTAL HYPERPLASIA OF BREAST: Primary | ICD-10-CM

## 2024-05-13 DIAGNOSIS — N60.99 BENIGN MAMMARY DYSPLASIA, UNSPECIFIED LATERALITY: ICD-10-CM

## 2024-05-13 LAB
ALBUMIN SERPL-MCNC: 4 G/DL (ref 3.5–5)
ALBUMIN/GLOB SERPL: 1.5 (ref 1–1.9)
ALP SERPL-CCNC: 90 U/L (ref 35–104)
ALT SERPL-CCNC: 16 U/L (ref 12–65)
ANION GAP SERPL CALC-SCNC: 11 MMOL/L (ref 9–18)
AST SERPL-CCNC: 22 U/L (ref 15–37)
BASOPHILS # BLD: 0 K/UL (ref 0–0.2)
BASOPHILS NFR BLD: 1 % (ref 0–2)
BILIRUB SERPL-MCNC: 0.6 MG/DL (ref 0–1.2)
BUN SERPL-MCNC: 6 MG/DL (ref 6–23)
CALCIUM SERPL-MCNC: 9.4 MG/DL (ref 8.8–10.2)
CHLORIDE SERPL-SCNC: 102 MMOL/L (ref 98–107)
CO2 SERPL-SCNC: 24 MMOL/L (ref 20–28)
CREAT SERPL-MCNC: 0.75 MG/DL (ref 0.6–1.1)
DIFFERENTIAL METHOD BLD: NORMAL
EOSINOPHIL # BLD: 0.1 K/UL (ref 0–0.8)
EOSINOPHIL NFR BLD: 2 % (ref 0.5–7.8)
ERYTHROCYTE [DISTWIDTH] IN BLOOD BY AUTOMATED COUNT: 12.9 % (ref 11.9–14.6)
GLOBULIN SER CALC-MCNC: 2.7 G/DL (ref 2.3–3.5)
GLUCOSE SERPL-MCNC: 94 MG/DL (ref 70–99)
HCT VFR BLD AUTO: 42.1 % (ref 35.8–46.3)
HGB BLD-MCNC: 14.1 G/DL (ref 11.7–15.4)
IMM GRANULOCYTES # BLD AUTO: 0 K/UL (ref 0–0.5)
IMM GRANULOCYTES NFR BLD AUTO: 1 % (ref 0–5)
LYMPHOCYTES # BLD: 2.7 K/UL (ref 0.5–4.6)
LYMPHOCYTES NFR BLD: 44 % (ref 13–44)
MCH RBC QN AUTO: 31.7 PG (ref 26.1–32.9)
MCHC RBC AUTO-ENTMCNC: 33.5 G/DL (ref 31.4–35)
MCV RBC AUTO: 94.6 FL (ref 82–102)
MONOCYTES # BLD: 0.5 K/UL (ref 0.1–1.3)
MONOCYTES NFR BLD: 9 % (ref 4–12)
NEUTS SEG # BLD: 2.7 K/UL (ref 1.7–8.2)
NEUTS SEG NFR BLD: 44 % (ref 43–78)
NRBC # BLD: 0 K/UL (ref 0–0.2)
PLATELET # BLD AUTO: 240 K/UL (ref 150–450)
PMV BLD AUTO: 10.3 FL (ref 9.4–12.3)
POTASSIUM SERPL-SCNC: 3.9 MMOL/L (ref 3.5–5.1)
PROT SERPL-MCNC: 6.6 G/DL (ref 6.3–8.2)
RBC # BLD AUTO: 4.45 M/UL (ref 4.05–5.2)
SODIUM SERPL-SCNC: 137 MMOL/L (ref 136–145)
WBC # BLD AUTO: 6.2 K/UL (ref 4.3–11.1)

## 2024-05-13 PROCEDURE — 36415 COLL VENOUS BLD VENIPUNCTURE: CPT

## 2024-05-13 PROCEDURE — 99213 OFFICE O/P EST LOW 20 MIN: CPT | Performed by: INTERNAL MEDICINE

## 2024-05-13 PROCEDURE — 80053 COMPREHEN METABOLIC PANEL: CPT

## 2024-05-13 PROCEDURE — 85025 COMPLETE CBC W/AUTO DIFF WBC: CPT

## 2024-05-13 ASSESSMENT — PATIENT HEALTH QUESTIONNAIRE - PHQ9
SUM OF ALL RESPONSES TO PHQ9 QUESTIONS 1 & 2: 0
SUM OF ALL RESPONSES TO PHQ QUESTIONS 1-9: 0
1. LITTLE INTEREST OR PLEASURE IN DOING THINGS: NOT AT ALL
2. FEELING DOWN, DEPRESSED OR HOPELESS: NOT AT ALL
SUM OF ALL RESPONSES TO PHQ QUESTIONS 1-9: 0

## 2024-05-13 NOTE — PATIENT INSTRUCTIONS
Patient Information from Today's Visit    The members of your [facility] are listed below:    Physician Provider: Steph Renteria, Medical Oncologist  Advanced Practice Clinician: Mayelin Lyon NP  Registered Nurse: Luisana SMART RN  Navigator: N/A  Medical Assistant: Rosanna NICKERSON MA  : Angela PERKINS   Supportive Care Services: Neha EDDY LMSW    Follow Up Instructions: About 6 months.    Labs reviewed.  Symptoms reviewed.  Mammogram due now.  You can call to schedule this at [phone].  MRI of breasts due 4-6 months after mammogram.  You can call to schedule this at [phone].      Treatment Summary has been discussed and given to patient:N/A      Current Labs:   Hospital Outpatient Visit on 05/13/2024   Component Date Value Ref Range Status    WBC 05/13/2024 6.2  4.3 - 11.1 K/uL Final    RBC 05/13/2024 4.45  4.05 - 5.2 M/uL Final    Hemoglobin 05/13/2024 14.1  11.7 - 15.4 g/dL Final    Hematocrit 05/13/2024 42.1  35.8 - 46.3 % Final    MCV 05/13/2024 94.6  82.0 - 102.0 FL Final    MCH 05/13/2024 31.7  26.1 - 32.9 PG Final    MCHC 05/13/2024 33.5  31.4 - 35.0 g/dL Final    RDW 05/13/2024 12.9  11.9 - 14.6 % Final    Platelets 05/13/2024 240  150 - 450 K/uL Final    MPV 05/13/2024 10.3  9.4 - 12.3 FL Final    nRBC 05/13/2024 0.00  0.0 - 0.2 K/uL Final    **Note: Absolute NRBC parameter is now reported with Hemogram**    Differential Type 05/13/2024 AUTOMATED    Final    Neutrophils % 05/13/2024 44  43 - 78 % Final    Lymphocytes % 05/13/2024 44  13 - 44 % Final    Monocytes % 05/13/2024 9  4.0 - 12.0 % Final    Eosinophils % 05/13/2024 2  0.5 - 7.8 % Final    Basophils % 05/13/2024 1  0.0 - 2.0 % Final    Immature Granulocytes % 05/13/2024 1  0.0 - 5.0 % Final    Neutrophils Absolute 05/13/2024 2.7  1.7 - 8.2 K/UL Final    Lymphocytes Absolute 05/13/2024 2.7  0.5 - 4.6 K/UL Final    Monocytes Absolute 05/13/2024 0.5  0.1 - 1.3 K/UL Final    Eosinophils Absolute 05/13/2024 0.1  0.0 - 0.8

## 2024-05-19 PROBLEM — N60.92 ATYPICAL HYPERPLASIA OF LEFT BREAST: Status: ACTIVE | Noted: 2024-05-19

## 2024-05-20 LAB
C TRACH RRNA CVX QL NAA+PROBE: NEGATIVE
COLLECTION METHOD: ABNORMAL
CYTOLOGIST CVX/VAG CYTO: ABNORMAL
CYTOLOGY CVX/VAG DOC THIN PREP: ABNORMAL
DATE OF LMP: ABNORMAL
HPV APTIMA: POSITIVE
HPV GENOTYPE REFLEX: ABNORMAL
Lab: ABNORMAL
N GONORRHOEA RRNA CVX QL NAA+PROBE: NEGATIVE
OTHER PT INFO: ABNORMAL
PAP SOURCE: ABNORMAL
PATH REPORT.FINAL DX SPEC: ABNORMAL
PATHOLOGIST CVX/VAG CYTO: ABNORMAL
PATHOLOGIST PROVIDED ICD: ABNORMAL
PREV CYTO INFO: ABNORMAL
PREV TREATMENT RESULTS: NEGATIVE
PREV TREATMENT: ABNORMAL
STAT OF ADQ CVX/VAG CYTO-IMP: ABNORMAL
T VAGINALIS RRNA SPEC QL NAA+PROBE: NEGATIVE

## 2024-05-21 NOTE — PROGRESS NOTES
Palpation of spleen reveals no abnormalities with respect to size, tenderness or masses.   Breast: Symmetrical, no: dimpling, retractions, adenopathy, dominant masses or nipple discharge elicited.    Breasts show no palpable masses or tenderness.   No changes suspicious for malignancy      Genitourinary:  External genitalia are normal in appearance.   Examination of urethral meatus reveals location normal and size normal.   Examination of urethra shows no abnormalities.   Examination of vaginal vault reveals no abnormalities.   Cervix: shows no pathology.  Uterus:  Normal size, shape and contour.     Adnexa and parametria show no masses, tenderness, organomegaly or nodularity.      Examination of anus and perineum shows no abnormalities.   Rectal exam reveals normal sphincter tone without presence of hemorrhoids or masses.     ASSESSMENT and PLAN    1.  40 yo, G0.  AE.  Importance of self breast exam reviewed, pt educated how to perform SBE.      2.  Her PCP is Dr. Lucio Nayak.  Refer to dermatology    3.   No info given re covid and flu vacc status.   Immunization History   Administered Date(s) Administered    TDaP, ADACEL (age 10y-64y), BOOSTRIX (age 10y+), IM, 0.5mL 06/13/2022     4. No h/o abnormal paps. Patient requests pap today.       5.  Left nipple discharge yellow-with slight green tinge, hemoccult card, hemoccult positive, refer to breast surgeon  6/2022--> nl MMG, US and breast MRI  + FH breast cancer, mother @ 41 yo    6.  Wants Mirena IUD, release form signed, continue OCs in the interim.  Wants preinsertion analgesia    Wt Readings from Last 3 Encounters:  Wt Readings from Last 3 Encounters:   05/09/24 64.4 kg (142 lb)   12/28/23 68.5 kg (151 lb)   12/20/23 68.5 kg (151 lb)       BP Readings from Last 3 Encounters:  BP Readings from Last 3 Encounters:   05/09/24 122/68   12/28/23 (!) 115/56   12/20/23 118/62         Diagnosis Orders   1. Well woman exam with routine gynecological exam        2. 
0.5mL 06/13/2022     4. No h/o abnormal paps. Patient requests pap today.       5.  Left breast moderate intraductal hyperplasia, FH mom breast cancer  7/2022 ov:  Left nipple discharge yellow-with slight green tinge, hemoccult card, hemoccult positive, refer to breast surgeon  6/2022--> nl MMG, US and breast MRI  + FH breast cancer, mother @ 41 yo    6.  Concerns regarding safety of continuing OCs with her diagnosis of left breast moderate intraductal hyperplasia.  Is interested in nonhormonal contraceptive options.    Reviewed  tubal ligation (does not want to take time off training for recovery), diaphragm, condoms copper T IUD  Wants ParaGard IUD, release form signed, continue OCs in the interim.  Declines preinsertion analgesia    Has an appointment with Dr. Renteria next week.    7.  Required surgery for a torn bicep following a malpositioned tetanus injection    Wt Readings from Last 3 Encounters:   05/13/24 63.5 kg (140 lb)   05/09/24 64.4 kg (142 lb)   12/28/23 68.5 kg (151 lb)       BP Readings from Last 3 Encounters:  BP Readings from Last 3 Encounters:   05/13/24 104/62   05/09/24 122/68   12/28/23 (!) 115/56         Diagnosis Orders   1. Well woman exam with routine gynecological exam        2. Screening for cervical cancer  PAP IG, CT-NG-TV, Aptima HPV and rfx 16/18,45([id number])    PAP IG, CT-NG-TV, Aptima HPV and rfx 16/18,45([id number])      3. Screening for human papillomavirus (HPV)  PAP IG, CT-NG-TV, Aptima HPV and rfx 16/18,45([id number])    PAP IG, CT-NG-TV, Aptima HPV and rfx 16/18,45([id number])      4. Encounter for screening mammogram for malignant neoplasm of breast  GAIL LESLIE DIGITAL SCREEN BILATERAL      5. Screening for STD (sexually transmitted disease)  PAP IG, CT-NG-TV, Aptima HPV and rfx 16/18,45([id number])    PAP IG, CT-NG-TV, Aptima HPV and rfx 16/18,45([id number])      6. Cervical high risk HPV (human papillomavirus) test positive        7. Intraductal

## 2024-05-23 ENCOUNTER — PROCEDURE VISIT (OUTPATIENT)
Dept: OBGYN CLINIC | Age: 44
End: 2024-05-23
Payer: COMMERCIAL

## 2024-05-23 VITALS
WEIGHT: 138 LBS | DIASTOLIC BLOOD PRESSURE: 72 MMHG | BODY MASS INDEX: 20.92 KG/M2 | SYSTOLIC BLOOD PRESSURE: 110 MMHG | HEIGHT: 68 IN

## 2024-05-23 DIAGNOSIS — R87.610 ASCUS WITH POSITIVE HIGH RISK HPV CERVICAL: ICD-10-CM

## 2024-05-23 DIAGNOSIS — R87.810 CERVICAL HIGH RISK HPV (HUMAN PAPILLOMAVIRUS) TEST POSITIVE: Primary | ICD-10-CM

## 2024-05-23 DIAGNOSIS — Z01.812 PRE-PROCEDURE LAB EXAM: ICD-10-CM

## 2024-05-23 DIAGNOSIS — R87.810 ASCUS WITH POSITIVE HIGH RISK HPV CERVICAL: ICD-10-CM

## 2024-05-23 LAB
HCG, PREGNANCY, URINE, POC: NEGATIVE
VALID INTERNAL CONTROL, POC: YES

## 2024-05-23 PROCEDURE — 81025 URINE PREGNANCY TEST: CPT | Performed by: OBSTETRICS & GYNECOLOGY

## 2024-05-23 PROCEDURE — 57454 BX/CURETT OF CERVIX W/SCOPE: CPT | Performed by: OBSTETRICS & GYNECOLOGY

## 2024-05-28 ENCOUNTER — TELEPHONE (OUTPATIENT)
Dept: OBGYN CLINIC | Age: 44
End: 2024-05-28

## 2024-05-28 NOTE — TELEPHONE ENCOUNTER
Called and LVM instructing patient to return call to go over pap results and get pap scheduled.     ----- Message from Liane Key MD sent at 5/21/2024  8:00 PM EDT -----  Her colposcopy needs to be scheduled, please schedule it    Per Dr. Key, Notify pt, ASCUS w/ + HPV--> schedule colpo; Negative:  chlamydia, gonorrhea and trichomonas

## 2024-06-05 NOTE — PROGRESS NOTES
Colposcopy  Sheila Wilson  43 y.o.  1980    Today:    5/23/24   Patient's last menstrual period was 05/18/2024 (exact date).    BC:   OCP (estrogen/progesterone).     Pap smear flow sheet:  Non-smoker     7/11/2022  Pap:  Normal w/ + HRHPV  8/8/2022 colpo: all neg ( ECC and 2 bxs).  Repeat pap 1 y.    5/9/24  ASCUS, + HPV   5/23/24 colpo:       Allergies   Allergen Reactions    Ciprofloxacin Other (See Comments)     Leg numbness  Loss of sensation in side of both legs  10 yrs ago    Amoxicillin Rash    Penicillins Hives and Rash       Current Outpatient Medications   Medication Sig    escitalopram (LEXAPRO) 10 MG tablet     NONFORMULARY     meloxicam (MOBIC) 7.5 MG tablet Take 1 tablet by mouth in the morning and at bedtime for 14 days Take 1 tablet po BID for 2 weeks for pain and inflamation    naloxone (NARCAN) 4 MG/0.1ML LIQD nasal spray 1 spray by Nasal route as needed for Opioid Reversal (Patient not taking: Reported on 5/13/2024)     No current facility-administered medications for this visit.       Past Medical History:   Diagnosis Date    Atypical hyperplasia of left breast 2022    COVID 2021    not hospitalized    COVID 11/2022    not hospitalized       Past Surgical History:   Procedure Laterality Date    BICEPS TENDON REPAIR Left 8/3/2023    OPEN BICEPS TENODESIS performed by RAHEL High MD at [facility]    BREAST SURGERY Left 8/24/2022    BREAST LESION BIOPSY EXCISION performed by Sanjuana Gilbert DO at [facility] OR    SHOULDER ARTHROSCOPY Left 8/3/2023    LEFT SHOULDER SCOPE DEBRIDEMENT performed by RAHEL High MD at [facility]    SHOULDER ARTHROSCOPY Left 12/28/2023    LEFT SHOULDER SCOPE MANIPULATION UNDER ANESTHESIA LYSIS OF ADHESIONS performed by RAHEL High MD at [facility]    WISDOM TOOTH EXTRACTION         Family History   Problem Relation Age of Onset    Dementia Mother 76    Breast Cancer Mother 42        radiation only    Arthritis Mother         rheumatoid arthritis    Cancer

## 2024-06-13 ENCOUNTER — HOSPITAL ENCOUNTER (OUTPATIENT)
Dept: MAMMOGRAPHY | Age: 44
Discharge: HOME | End: 2024-06-13
Attending: OBSTETRICS & GYNECOLOGY
Payer: COMMERCIAL

## 2024-06-13 DIAGNOSIS — Z12.31 ENCOUNTER FOR SCREENING MAMMOGRAM FOR MALIGNANT NEOPLASM OF BREAST: ICD-10-CM

## 2024-06-13 PROCEDURE — 77063 BREAST TOMOSYNTHESIS BI: CPT

## 2024-07-11 ENCOUNTER — HOSPITAL ENCOUNTER (OUTPATIENT)
Dept: MAMMOGRAPHY | Age: 44
End: 2024-07-11
Attending: OBSTETRICS & GYNECOLOGY
Payer: COMMERCIAL

## 2024-07-11 ENCOUNTER — HOSPITAL ENCOUNTER (OUTPATIENT)
Dept: MAMMOGRAPHY | Age: 44
Discharge: HOME | End: 2024-07-11
Attending: OBSTETRICS & GYNECOLOGY
Payer: COMMERCIAL

## 2024-07-11 DIAGNOSIS — R92.8 ABNORMAL SCREENING MAMMOGRAM: ICD-10-CM

## 2024-07-11 PROCEDURE — 77065 DX MAMMO INCL CAD UNI: CPT

## 2024-07-11 PROCEDURE — 76642 ULTRASOUND BREAST LIMITED: CPT

## 2024-07-11 PROCEDURE — G0279 TOMOSYNTHESIS, MAMMO: HCPCS

## 2024-07-24 ENCOUNTER — HOSPITAL ENCOUNTER (OUTPATIENT)
Dept: MAMMOGRAPHY | Age: 44
Discharge: HOME | End: 2024-07-27
Payer: COMMERCIAL

## 2024-07-24 DIAGNOSIS — R92.8 ABNORMAL ULTRASOUND OF BREAST: ICD-10-CM

## 2024-07-24 PROCEDURE — 19083 BX BREAST 1ST LESION US IMAG: CPT

## 2024-07-24 PROCEDURE — 2500000003 HC RX 250 WO HCPCS: Performed by: OBSTETRICS & GYNECOLOGY

## 2024-07-24 PROCEDURE — 88305 TISSUE EXAM BY PATHOLOGIST: CPT

## 2024-07-24 PROCEDURE — 77065 DX MAMMO INCL CAD UNI: CPT

## 2024-07-24 RX ORDER — LIDOCAINE HYDROCHLORIDE 10 MG/ML
10 INJECTION, SOLUTION INFILTRATION; PERINEURAL ONCE
Status: COMPLETED | OUTPATIENT
Start: 2024-07-24 | End: 2024-07-24

## 2024-07-24 RX ADMIN — LIDOCAINE HYDROCHLORIDE 5 ML: 10 INJECTION, SOLUTION INFILTRATION; PERINEURAL at 08:23

## 2024-07-25 ENCOUNTER — TELEPHONE (OUTPATIENT)
Dept: MAMMOGRAPHY | Age: 44
End: 2024-07-25

## 2024-08-01 ENCOUNTER — OFFICE VISIT (OUTPATIENT)
Dept: OBGYN CLINIC | Age: 44
End: 2024-08-01

## 2024-08-01 VITALS
HEIGHT: 68 IN | DIASTOLIC BLOOD PRESSURE: 62 MMHG | SYSTOLIC BLOOD PRESSURE: 110 MMHG | BODY MASS INDEX: 20.76 KG/M2 | WEIGHT: 137 LBS

## 2024-08-01 DIAGNOSIS — Z30.09 FAMILY PLANNING SERVICES: Primary | ICD-10-CM

## 2024-08-01 RX ORDER — COPPER 313.4 MG/1
1 INTRAUTERINE DEVICE INTRAUTERINE ONCE
COMMUNITY

## 2024-08-01 NOTE — PROGRESS NOTES
IUD insertion    Sheila Wilson  43 y.o.  1980  [id number]    Today:   8/1/24     Sheila Wilson  presents today for PARAGARD  IUD insertion.    Patient's last menstrual period was 07/11/2024.    Pregnancy test was NEGATIVE     Physical Exam:    /62   Ht 1.727 m (5' 8\")   Wt 62.1 kg (137 lb)   LMP 07/11/2024   BMI 20.83 kg/m²     Sheila Wilson is a 43 y.o. female who appears pleasant, well developed, well nourished, with good attention to hygiene and body habitus is in no apparent distress.     Abdomen: Soft. No masses, nontender.   External genitalia: normal appearing, minimal erythema, no lesions  Vagina: pink with normal rugations, no lesions.  Cervix: nulliparous, normal appearing, no lesions.    Uterus: midline, normal size, shape and contour  Adnexa: bilaterally  no masses, non tender    Procedure:  The consent was signed and dated.  Time out was done to confirm procedure.  A bivalve speculum was placed into the vagina.  Cervix was prepped with Betadine.    10 cc 1% lidocaine without epi gel was flushed into the upper vagina and through the external os thus into the endocervical canal.      The distal cervix was coated with the lidocaine gel several times.    Subsequently a long spinal needle was used to flush ~ 5cc 1% lidocaine without epi up into the endocervical canal and endometrial cavity.    The needle itself did not soares the tissue.  The needle was used  like a cannula to introduce the lidocaine into the endocervical canal.    Waited a few more minutes for numbing to progress while bathing the cervix with the lidocaine gel that was pooling in the upper vagina.    The single-tooth tenaculum was was placed in the usual fashion (with pt cough), again patient tolerated without discomfort.   A lidocaine soaked cytobrush was introduced into the external os to further numb the endocervical canal.    The uterus was sounded to 7cm.       Paragard IUD inserted without complication.

## 2024-08-07 ENCOUNTER — PATIENT MESSAGE (OUTPATIENT)
Dept: OBGYN CLINIC | Age: 44
End: 2024-08-07

## 2024-08-07 RX ORDER — TRANEXAMIC ACID 650 MG/1
TABLET ORAL
Qty: 30 TABLET | Refills: 2 | Status: SHIPPED | OUTPATIENT
Start: 2024-08-07

## 2024-08-07 NOTE — TELEPHONE ENCOUNTER
Lysteda 650 mg 2 po tid for 5 days max. Only take Lysteda on days of heavy vaginal bleeding.  Rx sent to her [facility].

## 2024-08-18 PROBLEM — Z91.89 AT HIGH RISK FOR BREAST CANCER: Status: ACTIVE | Noted: 2024-08-18

## 2024-08-18 PROBLEM — D24.2 INTRADUCTAL PAPILLOMA OF LEFT BREAST: Status: ACTIVE | Noted: 2024-08-18

## 2024-08-18 PROBLEM — R92.343 EXTREMELY DENSE TISSUE OF BOTH BREASTS ON MAMMOGRAPHY: Status: ACTIVE | Noted: 2024-08-18

## 2024-08-18 NOTE — PROGRESS NOTES
identified patient or procedure risk factors   ?Diagnosis or treatment significantly limited by social determinants of health       65883  16178 High High  ?1or more chronic illnesses with severe exacerbation, progression, or side effects of treatment;    or  ?1 acute or chronic illness or injury that poses a threat to life or bodily function   Extensive  (Must meet the requirements of at least 2 out of 3 categories)  Category 1: Tests, documents, or independent historian(s)  ?Any combination of 3 from the following:   ?Review of prior external note(s) from each unique source*;  ?Review of the result(s) of each unique test*;   ?Ordering of each unique test*;   ?Assessment requiring an independent historian(s)    or   Category 2: Independent interpretation of tests   ?Independent interpretation of a test performed by another physician/other qualified health care professional (not separately reported);     or  Category 3: Discussion of management or test interpretation  ?Discussion of management or test interpretation with external physician/other qualified health care professional/appropriate source (not separately reported)   High risk of morbidity from additional diagnostic testing or treatment  Examples only:  ?Drug therapy requiring intensive monitoring for toxicity  ?Decision regarding elective major surgery with identified patient or procedure risk factors  ?Decision regarding emergency major surgery  ?Decision regarding hospitalization  ?Decision not to resuscitate or to de-escalate care because of poor prognosis      Parenteral controlled substances             I have personally performed a face-to-face diagnostic evaluation and management  service on this patient.      I have independently seen the patient.   I have independently obtained the above history from the patient/family.    I have independently examined the patient with above findings.  I have independently reviewed data/labs for this patient and

## 2024-08-19 ENCOUNTER — OFFICE VISIT (OUTPATIENT)
Dept: SURGERY | Age: 44
End: 2024-08-19
Payer: COMMERCIAL

## 2024-08-19 VITALS — WEIGHT: 137 LBS | HEIGHT: 68 IN | BODY MASS INDEX: 20.76 KG/M2

## 2024-08-19 DIAGNOSIS — R92.343 EXTREMELY DENSE TISSUE OF BOTH BREASTS ON MAMMOGRAPHY: ICD-10-CM

## 2024-08-19 DIAGNOSIS — Z91.89 AT HIGH RISK FOR BREAST CANCER: Primary | ICD-10-CM

## 2024-08-19 DIAGNOSIS — D24.2 INTRADUCTAL PAPILLOMA OF LEFT BREAST: ICD-10-CM

## 2024-08-19 DIAGNOSIS — Z12.39 BREAST CANCER SCREENING, HIGH RISK PATIENT: ICD-10-CM

## 2024-08-19 PROCEDURE — 99214 OFFICE O/P EST MOD 30 MIN: CPT | Performed by: SURGERY

## 2024-09-03 ENCOUNTER — HOSPITAL ENCOUNTER (OUTPATIENT)
Dept: MRI IMAGING | Age: 44
Discharge: HOME | End: 2024-09-06
Attending: SURGERY
Payer: COMMERCIAL

## 2024-09-03 DIAGNOSIS — D24.2 INTRADUCTAL PAPILLOMA OF LEFT BREAST: ICD-10-CM

## 2024-09-03 DIAGNOSIS — Z91.89 AT HIGH RISK FOR BREAST CANCER: ICD-10-CM

## 2024-09-03 DIAGNOSIS — R92.343 EXTREMELY DENSE TISSUE OF BOTH BREASTS ON MAMMOGRAPHY: ICD-10-CM

## 2024-09-03 PROCEDURE — A9579 GAD-BASE MR CONTRAST NOS,1ML: HCPCS | Performed by: SURGERY

## 2024-09-03 PROCEDURE — C8908 MRI W/O FOL W/CONT, BREAST,: HCPCS

## 2024-09-03 PROCEDURE — 6360000004 HC RX CONTRAST MEDICATION: Performed by: SURGERY

## 2024-09-03 RX ORDER — SODIUM CHLORIDE 0.9 % (FLUSH) 0.9 %
10 SYRINGE (ML) INJECTION AS NEEDED
Status: DISCONTINUED | OUTPATIENT
Start: 2024-09-03 | End: 2024-09-07 | Stop reason: HOSPADM

## 2024-09-03 RX ADMIN — GADOTERIDOL 12 ML: 279.3 INJECTION, SOLUTION INTRAVENOUS at 17:22

## 2024-09-11 ENCOUNTER — PREP FOR PROCEDURE (OUTPATIENT)
Dept: SURGERY | Age: 44
End: 2024-09-11

## 2024-09-11 ENCOUNTER — OFFICE VISIT (OUTPATIENT)
Dept: SURGERY | Age: 44
End: 2024-09-11
Payer: COMMERCIAL

## 2024-09-11 VITALS — HEIGHT: 68 IN | BODY MASS INDEX: 20.76 KG/M2 | WEIGHT: 137 LBS

## 2024-09-11 DIAGNOSIS — D24.2 PAPILLOMA OF LEFT BREAST: ICD-10-CM

## 2024-09-11 DIAGNOSIS — R92.343 EXTREMELY DENSE TISSUE OF BOTH BREASTS ON MAMMOGRAPHY: Primary | ICD-10-CM

## 2024-09-11 DIAGNOSIS — D24.2 PAPILLOMA OF LEFT BREAST: Primary | ICD-10-CM

## 2024-09-11 DIAGNOSIS — D24.2 INTRADUCTAL PAPILLOMA OF LEFT BREAST: ICD-10-CM

## 2024-09-11 DIAGNOSIS — Z12.39 BREAST CANCER SCREENING, HIGH RISK PATIENT: ICD-10-CM

## 2024-09-11 PROCEDURE — 99214 OFFICE O/P EST MOD 30 MIN: CPT | Performed by: SURGERY

## 2024-09-17 PROBLEM — Z12.39 BREAST CANCER SCREENING, HIGH RISK PATIENT: Status: RESOLVED | Noted: 2023-09-13 | Resolved: 2024-09-17

## 2024-10-13 RX ORDER — SODIUM CHLORIDE 0.9 % (FLUSH) 0.9 %
5-40 SYRINGE (ML) INJECTION EVERY 12 HOURS SCHEDULED
Status: CANCELLED | OUTPATIENT
Start: 2024-10-13

## 2024-10-13 RX ORDER — SODIUM CHLORIDE 9 MG/ML
INJECTION, SOLUTION INTRAVENOUS PRN
Status: CANCELLED | OUTPATIENT
Start: 2024-10-13

## 2024-10-13 RX ORDER — SODIUM CHLORIDE 0.9 % (FLUSH) 0.9 %
5-40 SYRINGE (ML) INJECTION PRN
Status: CANCELLED | OUTPATIENT
Start: 2024-10-13

## 2024-10-22 NOTE — PERIOP NOTE
Patient verified name and .  Order for consent found in EHR and matches case posting; patient verifies procedure.   Type 1B surgery, phone assessment complete.  Orders received.  Labs per surgeon: None  Labs per anesthesia protocol: None.    Patient answered medical/surgical history questions at their best of ability. All prior to admission medications documented in EPIC.    Patient instructed to continue taking all prescription medications up to the day of surgery but to take only the following medications the day of surgery according to anesthesia guidelines with a small sip of water: NONE.     Patient informed that all vitamins and supplements should be held 7 days prior to surgery and NSAIDS 5 days prior to surgery.     Patient instructed on the following:    > Arrive at [facility]tr[facility], time of arrival to be called the day before by 1700  > NPO after midnight, unless otherwise indicated, including gum, mints, and ice chips--**Please drink 32 ounces of non-caffeinated clear liquids, on the day of surgery, 2 hours prior to your arrival time to avoid dehydration.   > Responsible adult must drive patient to the hospital, stay during surgery, and patient will need supervision 24 hours after anesthesia  > Use non moisturizing soap in shower the night before surgery and on the morning of surgery  > All piercings must be removed prior to arrival.    > Leave all valuables (money and jewelry) at home but bring insurance card and ID on DOS.   > You may be required to pay a deductible or co-pay on the day of your procedure. You can pre-pay by calling [phone] if your surgery is at the [facility] or [phone] if your surgery is at the [facility].  > Do not wear deodorant, make-up, nail polish, lotions, cologne, perfumes, powders, or oil on skin. Artificial nails are not permitted.

## 2024-10-29 ENCOUNTER — CLINICAL DOCUMENTATION (OUTPATIENT)
Dept: OBGYN CLINIC | Age: 44
End: 2024-10-29

## 2024-10-29 ENCOUNTER — PROCEDURE VISIT (OUTPATIENT)
Dept: OBGYN CLINIC | Age: 44
End: 2024-10-29
Payer: COMMERCIAL

## 2024-10-29 DIAGNOSIS — Z30.431 IUD CHECK UP: Primary | ICD-10-CM

## 2024-10-29 PROCEDURE — 76830 TRANSVAGINAL US NON-OB: CPT | Performed by: OBSTETRICS & GYNECOLOGY

## 2024-10-29 NOTE — PROGRESS NOTES
Patient left after her US without being seen    10/29/24 US today:    Uterus 9/4/4 cm, vol 74 cm³  IUD IS SEEN IN THE CERVICAL CANAL, APPROX. 9.1MM AWAY FROM THE EXTERNAL OS.   ENDOMETRIUM= 8.4MM. TRILAMINAR STRIPE. (MILDLY ARCUATE).   RT OV- WNL.   LT OV- 2 SMALL CYSTS:   1) RUPTURED CL 2.0 X 1.3 X 1.4CM.   2) SIMPLE 1.5 X 0.7 X 1.3CM.   NO FF.

## 2024-10-30 ENCOUNTER — CLINICAL DOCUMENTATION (OUTPATIENT)
Dept: OBGYN CLINIC | Age: 44
End: 2024-10-30

## 2024-10-30 NOTE — PROGRESS NOTES
Called and spoke with patient, reports she has been doing well.  Does not describe considerable pain or bleeding immediately post insertion or the week since.  Denies dyspareunia.  Reports she ran in 2 races in the weekend following insertion.  Reports a short episode of cramping following each race.  Her periods following insertion have been heavier with more cramping.  Patient was unable to come in for her scheduled post insertion US to verify proper position due to illness.  Patient  has an office visit scheduled next week.  Reports she is feeling well, denies fever, denies unusual vaginal discharge or odor.  She will let me know if she develops other issues or problems before her follow-up office visit.

## 2024-11-05 ENCOUNTER — OFFICE VISIT (OUTPATIENT)
Dept: OBGYN CLINIC | Age: 44
End: 2024-11-05
Payer: COMMERCIAL

## 2024-11-05 ENCOUNTER — ANESTHESIA EVENT (OUTPATIENT)
Dept: SURGERY | Age: 44
End: 2024-11-05
Payer: COMMERCIAL

## 2024-11-05 VITALS
SYSTOLIC BLOOD PRESSURE: 124 MMHG | DIASTOLIC BLOOD PRESSURE: 70 MMHG | BODY MASS INDEX: 22.28 KG/M2 | WEIGHT: 147 LBS | HEIGHT: 68 IN

## 2024-11-05 DIAGNOSIS — Z30.09 ENCOUNTER FOR COUNSELING REGARDING CONTRACEPTION: ICD-10-CM

## 2024-11-05 DIAGNOSIS — T83.32XD MALPOSITIONED INTRAUTERINE DEVICE (IUD), SUBSEQUENT ENCOUNTER: Primary | ICD-10-CM

## 2024-11-05 PROCEDURE — 99213 OFFICE O/P EST LOW 20 MIN: CPT | Performed by: OBSTETRICS & GYNECOLOGY

## 2024-11-05 PROCEDURE — 58301 REMOVE INTRAUTERINE DEVICE: CPT | Performed by: OBSTETRICS & GYNECOLOGY

## 2024-11-05 NOTE — PROGRESS NOTES
Sheila Wilson  44 y.o.  1980  [id number]                             Today:  11/5/24                    *    Sheila Wilson  presents today for   1)  US w/ possible IUD removal.  .   2)  increased risk of breast cancer    8/1/24 Paragard IUD inserted.     10/29/24 US:    Uterus 9 x 4 x 4 cm, vol 74 cm³  IUD IS SEEN IN THE CERVICAL CANAL, APPROX. 9.1MM AWAY FROM THE EXTERNAL OS.   ENDOMETRIUM= 8.4MM. TRILAMINAR STRIPE. (MILDLY ARCUATE).   RT OV- WNL.   LT OV- 2 SMALL CYSTS:   1) RUPTURED CL 2.0 X 1.3 X 1.4CM.   2) SIMPLE 1.5 X 0.7 X 1.3CM.   NO FF     Per review of previous breast imaging, radiologist calculated her increased risk of breast cancer w/ the information that her PGM and Paunt both had breast cancer.   Per pt she does not know who her father is, has no paternal FH.          Patient's last menstrual period was 10/14/2024 (exact date).     Physical Exam:    /70   Ht 1.727 m (5' 8\")   Wt 66.7 kg (147 lb)   LMP 10/14/2024 (Exact Date)   BMI 22.35 kg/m²     Sheila Wilson  appears pleasant, well developed, well nourished, with good attention to hygiene and body habitus is in no apparent distress.     Abdomen: Soft. No masses, nontender.   External genitalia: normal appearing, minimal erythema, no lesions  Vagina: pink with normal rugations, no lesions.  Cervix: normal appearing, no lesions.  IUD string appears long, body of Paragard is palpable in the lower cervix.      Procedure:  The consent was signed and dated.  Time out was done to confirm procedure.  A bivalve speculum was placed into the vagina.  IUD string was easily grasped with a Chandler forcep and the IUD was easily removed intact.  Pt was shown the IUD prior to disposing.  Pt tolerated the procedure well with no complications.      An approved medical chaperone was present for all sensitive portions of the above exam      A/P:    1.  IUD removal. Wants to try another Paragard w/ insertion under US guidance.    Also

## 2024-11-05 NOTE — H&P
Intrauterine Copper IUD 1 each by IntraUTERine route once IUD insertion on 8/1/24    NONFORMULARY  (Patient not taking: Reported on 10/22/2024)    meloxicam (MOBIC) 7.5 MG tablet Take 1 tablet by mouth in the morning and at bedtime for 14 days Take 1 tablet po BID for 2 weeks for pain and inflamation    naloxone (NARCAN) 4 MG/0.1ML LIQD nasal spray 1 spray by Nasal route as needed for Opioid Reversal (Patient not taking: Reported on 5/13/2024)     ALLERGIES:  Ciprofloxacin, Amoxicillin, and Penicillins    Social History     Socioeconomic History    Marital status: Single     Spouse name: None    Number of children: None    Years of education: None    Highest education level: None   Occupational History    Occupation:    Tobacco Use    Smoking status: Never    Smokeless tobacco: Never   Vaping Use    Vaping status: Never Used   Substance and Sexual Activity    Alcohol use: Yes     Alcohol/week: 5.0 standard drinks of alcohol     Types: 5 Glasses of wine per week     Comment: About half a glass of wine a night    Drug use: Never    Sexual activity: Not Currently     Partners: Male     Social Determinants of Health     Financial Resource Strain: Low Risk  (12/20/2023)    Overall Financial Resource Strain (CARDIA)     Difficulty of Paying Living Expenses: Not hard at all   Food Insecurity: No Food Insecurity (12/20/2023)    Hunger Vital Sign     Worried About Running Out of Food in the Last Year: Never true     Ran Out of Food in the Last Year: Never true   Transportation Needs: Unknown (12/20/2023)    PRAPARE - Transportation     Lack of Transportation (Non-Medical): No   Physical Activity: Insufficiently Active (12/18/2023)    Exercise Vital Sign     Days of Exercise per Week: 7 days     Minutes of Exercise per Session: 10 min   Social Connections: Unknown (6/22/2022)    [facility]    Social Connections     Frequency of Communication with Friends and Family: Not  Morbidity or Mortality of pt Management     70787  15436 SF Minimal  ?1self-limited or minor problem Minimal or none Minimal risk of morbidity from additional diagnostic testing or Rx   01964  53847 Low Low  ?2or more self-limited or minor problems;    or  ?1stable chronic illness;    or  ?1acute, uncomplicated illness or injury   Limited  (Must meet the requirements of at least 1 of the 2 categories)  Category 1: Tests and documents   ?Any combination of 2 from the following:  ?Review of prior external note(s) from each unique source*;  ?review of the result(s) of each unique test*;   ?ordering of each unique test*    or   Category 2: Assessment requiring an independent historian(s)  (For the categories of independent interpretation of tests and discussion of management or test interpretation, see moderate or high) Low risk of morbidity from additional diagnostic testing or treatment     22686  44685 Mod Moderate  ?1or more chronic illnesses with exacerbation, progression, or side effects of treatment;    or  ?2or more stable chronic illnesses;    or  ?1undiagnosed new problem with uncertain prognosis;    or  ?1acute illness with systemic symptoms;    or  ?1acute complicated injury   Moderate  (Must meet the requirements of at least 1 out of 3 categories)  Category 1: Tests, documents, or independent historian(s)  ?Any combination of 3 from the following:   ?Review of prior external note(s) from each unique source*;  ?Review of the result(s) of each unique test*;  ?Ordering of each unique test*;  ?Assessment requiring an independent historian(s)    or  Category 2: Independent interpretation of tests   ?Independent interpretation of a test performed by another physician/other qualified health care professional (not separately reported);     or  Category 3: Discussion of management or test interpretation  ?Discussion of management or test interpretation with external physician/other qualified health care

## 2024-11-06 ENCOUNTER — ANESTHESIA (OUTPATIENT)
Dept: SURGERY | Age: 44
End: 2024-11-06
Payer: COMMERCIAL

## 2024-11-06 ENCOUNTER — APPOINTMENT (OUTPATIENT)
Dept: MAMMOGRAPHY | Age: 44
End: 2024-11-06
Attending: SURGERY
Payer: COMMERCIAL

## 2024-11-06 ENCOUNTER — HOSPITAL ENCOUNTER (OUTPATIENT)
Age: 44
Setting detail: OUTPATIENT SURGERY
Discharge: HOME | End: 2024-11-06
Attending: SURGERY | Admitting: SURGERY
Payer: COMMERCIAL

## 2024-11-06 VITALS
DIASTOLIC BLOOD PRESSURE: 58 MMHG | BODY MASS INDEX: 22.35 KG/M2 | OXYGEN SATURATION: 100 % | RESPIRATION RATE: 14 BRPM | SYSTOLIC BLOOD PRESSURE: 116 MMHG | HEIGHT: 68 IN | HEART RATE: 71 BPM | WEIGHT: 147.5 LBS | TEMPERATURE: 97.6 F

## 2024-11-06 DIAGNOSIS — D24.2 PAPILLOMA OF LEFT BREAST: Primary | ICD-10-CM

## 2024-11-06 DIAGNOSIS — D24.2 INTRADUCTAL PAPILLOMA OF LEFT BREAST: ICD-10-CM

## 2024-11-06 DIAGNOSIS — D24.2 PAPILLOMA OF LEFT BREAST: ICD-10-CM

## 2024-11-06 PROBLEM — R92.8 ABNORMAL MAGNETIC RESONANCE IMAGING OF LEFT BREAST: Status: ACTIVE | Noted: 2024-11-06

## 2024-11-06 LAB — HCG UR QL: NEGATIVE

## 2024-11-06 PROCEDURE — 76098 X-RAY EXAM SURGICAL SPECIMEN: CPT

## 2024-11-06 PROCEDURE — 6360000002 HC RX W HCPCS: Performed by: SURGERY

## 2024-11-06 PROCEDURE — 2500000003 HC RX 250 WO HCPCS: Performed by: NURSE ANESTHETIST, CERTIFIED REGISTERED

## 2024-11-06 PROCEDURE — 3700000001 HC ADD 15 MINUTES (ANESTHESIA): Performed by: SURGERY

## 2024-11-06 PROCEDURE — 19125 EXCISION BREAST LESION: CPT | Performed by: SURGERY

## 2024-11-06 PROCEDURE — 3600000013 HC SURGERY LEVEL 3 ADDTL 15MIN: Performed by: SURGERY

## 2024-11-06 PROCEDURE — 7100000000 HC PACU RECOVERY - FIRST 15 MIN: Performed by: SURGERY

## 2024-11-06 PROCEDURE — 3700000000 HC ANESTHESIA ATTENDED CARE: Performed by: SURGERY

## 2024-11-06 PROCEDURE — 88307 TISSUE EXAM BY PATHOLOGIST: CPT

## 2024-11-06 PROCEDURE — 2709999900 HC NON-CHARGEABLE SUPPLY: Performed by: SURGERY

## 2024-11-06 PROCEDURE — 7100000011 HC PHASE II RECOVERY - ADDTL 15 MIN: Performed by: SURGERY

## 2024-11-06 PROCEDURE — 7100000010 HC PHASE II RECOVERY - FIRST 15 MIN: Performed by: SURGERY

## 2024-11-06 PROCEDURE — 7100000001 HC PACU RECOVERY - ADDTL 15 MIN: Performed by: SURGERY

## 2024-11-06 PROCEDURE — 6360000002 HC RX W HCPCS: Performed by: NURSE ANESTHETIST, CERTIFIED REGISTERED

## 2024-11-06 PROCEDURE — 6370000000 HC RX 637 (ALT 250 FOR IP): Performed by: ANESTHESIOLOGY

## 2024-11-06 PROCEDURE — 3600000003 HC SURGERY LEVEL 3 BASE: Performed by: SURGERY

## 2024-11-06 PROCEDURE — 77065 DX MAMMO INCL CAD UNI: CPT

## 2024-11-06 PROCEDURE — 2500000003 HC RX 250 WO HCPCS: Performed by: SURGERY

## 2024-11-06 PROCEDURE — C1819 TISSUE LOCALIZATION-EXCISION: HCPCS

## 2024-11-06 PROCEDURE — 99024 POSTOP FOLLOW-UP VISIT: CPT | Performed by: SURGERY

## 2024-11-06 PROCEDURE — 2580000003 HC RX 258: Performed by: SURGERY

## 2024-11-06 PROCEDURE — 2580000003 HC RX 258: Performed by: NURSE ANESTHETIST, CERTIFIED REGISTERED

## 2024-11-06 PROCEDURE — 81025 URINE PREGNANCY TEST: CPT

## 2024-11-06 RX ORDER — OXYCODONE HYDROCHLORIDE 5 MG/1
5 TABLET ORAL
Status: COMPLETED | OUTPATIENT
Start: 2024-11-06 | End: 2024-11-06

## 2024-11-06 RX ORDER — MIDAZOLAM HYDROCHLORIDE 1 MG/ML
INJECTION, SOLUTION INTRAMUSCULAR; INTRAVENOUS
Status: DISCONTINUED | OUTPATIENT
Start: 2024-11-06 | End: 2024-11-06 | Stop reason: SDUPTHER

## 2024-11-06 RX ORDER — SODIUM CHLORIDE 9 MG/ML
INJECTION, SOLUTION INTRAVENOUS PRN
Status: DISCONTINUED | OUTPATIENT
Start: 2024-11-06 | End: 2024-11-06 | Stop reason: HOSPADM

## 2024-11-06 RX ORDER — ONDANSETRON 2 MG/ML
INJECTION INTRAMUSCULAR; INTRAVENOUS
Status: DISCONTINUED | OUTPATIENT
Start: 2024-11-06 | End: 2024-11-06 | Stop reason: SDUPTHER

## 2024-11-06 RX ORDER — PROCHLORPERAZINE EDISYLATE 5 MG/ML
5 INJECTION INTRAMUSCULAR; INTRAVENOUS
Status: DISCONTINUED | OUTPATIENT
Start: 2024-11-06 | End: 2024-11-06 | Stop reason: HOSPADM

## 2024-11-06 RX ORDER — HYDROCODONE BITARTRATE AND ACETAMINOPHEN 5; 325 MG/1; MG/1
1-2 TABLET ORAL EVERY 8 HOURS PRN
Qty: 28 TABLET | Refills: 0 | Status: SHIPPED | OUTPATIENT
Start: 2024-11-06 | End: 2024-11-13

## 2024-11-06 RX ORDER — FENTANYL CITRATE 50 UG/ML
100 INJECTION, SOLUTION INTRAMUSCULAR; INTRAVENOUS
Status: DISCONTINUED | OUTPATIENT
Start: 2024-11-06 | End: 2024-11-06 | Stop reason: HOSPADM

## 2024-11-06 RX ORDER — MIDAZOLAM HYDROCHLORIDE 2 MG/2ML
2 INJECTION, SOLUTION INTRAMUSCULAR; INTRAVENOUS
Status: DISCONTINUED | OUTPATIENT
Start: 2024-11-06 | End: 2024-11-06 | Stop reason: HOSPADM

## 2024-11-06 RX ORDER — SODIUM CHLORIDE 0.9 % (FLUSH) 0.9 %
5-40 SYRINGE (ML) INJECTION PRN
Status: DISCONTINUED | OUTPATIENT
Start: 2024-11-06 | End: 2024-11-06 | Stop reason: HOSPADM

## 2024-11-06 RX ORDER — FENTANYL CITRATE 50 UG/ML
INJECTION, SOLUTION INTRAMUSCULAR; INTRAVENOUS
Status: DISCONTINUED | OUTPATIENT
Start: 2024-11-06 | End: 2024-11-06 | Stop reason: SDUPTHER

## 2024-11-06 RX ORDER — SODIUM CHLORIDE 0.9 % (FLUSH) 0.9 %
5-40 SYRINGE (ML) INJECTION EVERY 12 HOURS SCHEDULED
Status: DISCONTINUED | OUTPATIENT
Start: 2024-11-06 | End: 2024-11-06 | Stop reason: HOSPADM

## 2024-11-06 RX ORDER — M-VIT,TX,IRON,MINS/CALC/FOLIC 27MG-0.4MG
1 TABLET ORAL DAILY
COMMUNITY

## 2024-11-06 RX ORDER — SODIUM CHLORIDE 0.9 % (FLUSH) 0.9 %
SYRINGE (ML) INJECTION
Status: DISCONTINUED | OUTPATIENT
Start: 2024-11-06 | End: 2024-11-06 | Stop reason: SDUPTHER

## 2024-11-06 RX ORDER — DEXTROSE MONOHYDRATE 100 MG/ML
INJECTION, SOLUTION INTRAVENOUS CONTINUOUS PRN
Status: DISCONTINUED | OUTPATIENT
Start: 2024-11-06 | End: 2024-11-06 | Stop reason: HOSPADM

## 2024-11-06 RX ORDER — PROPOFOL 10 MG/ML
INJECTION, EMULSION INTRAVENOUS
Status: DISCONTINUED | OUTPATIENT
Start: 2024-11-06 | End: 2024-11-06 | Stop reason: SDUPTHER

## 2024-11-06 RX ORDER — EPHEDRINE SULFATE/0.9% NACL/PF 50 MG/5 ML
SYRINGE (ML) INTRAVENOUS
Status: DISCONTINUED | OUTPATIENT
Start: 2024-11-06 | End: 2024-11-06 | Stop reason: SDUPTHER

## 2024-11-06 RX ORDER — KETAMINE HYDROCHLORIDE 50 MG/ML
INJECTION, SOLUTION INTRAMUSCULAR; INTRAVENOUS
Status: DISCONTINUED | OUTPATIENT
Start: 2024-11-06 | End: 2024-11-06 | Stop reason: SDUPTHER

## 2024-11-06 RX ORDER — BUPIVACAINE HYDROCHLORIDE 2.5 MG/ML
INJECTION, SOLUTION EPIDURAL; INFILTRATION; INTRACAUDAL PRN
Status: DISCONTINUED | OUTPATIENT
Start: 2024-11-06 | End: 2024-11-06 | Stop reason: HOSPADM

## 2024-11-06 RX ORDER — LIDOCAINE HYDROCHLORIDE 10 MG/ML
5 INJECTION, SOLUTION INFILTRATION; PERINEURAL ONCE
Status: COMPLETED | OUTPATIENT
Start: 2024-11-06 | End: 2024-11-06

## 2024-11-06 RX ORDER — NALOXONE HYDROCHLORIDE 0.4 MG/ML
INJECTION, SOLUTION INTRAMUSCULAR; INTRAVENOUS; SUBCUTANEOUS PRN
Status: DISCONTINUED | OUTPATIENT
Start: 2024-11-06 | End: 2024-11-06 | Stop reason: HOSPADM

## 2024-11-06 RX ORDER — LIDOCAINE HYDROCHLORIDE 20 MG/ML
INJECTION, SOLUTION EPIDURAL; INFILTRATION; INTRACAUDAL; PERINEURAL
Status: DISCONTINUED | OUTPATIENT
Start: 2024-11-06 | End: 2024-11-06 | Stop reason: SDUPTHER

## 2024-11-06 RX ORDER — SODIUM CHLORIDE 9 MG/ML
INJECTION, SOLUTION INTRAVENOUS PRN
Status: DISCONTINUED | OUTPATIENT
Start: 2024-11-06 | End: 2024-11-06

## 2024-11-06 RX ORDER — DIPHENHYDRAMINE HYDROCHLORIDE 50 MG/ML
12.5 INJECTION INTRAMUSCULAR; INTRAVENOUS
Status: DISCONTINUED | OUTPATIENT
Start: 2024-11-06 | End: 2024-11-06 | Stop reason: HOSPADM

## 2024-11-06 RX ORDER — ACETAMINOPHEN 500 MG
1000 TABLET ORAL ONCE
Status: COMPLETED | OUTPATIENT
Start: 2024-11-06 | End: 2024-11-06

## 2024-11-06 RX ORDER — DEXAMETHASONE SODIUM PHOSPHATE 10 MG/ML
INJECTION INTRAMUSCULAR; INTRAVENOUS
Status: DISCONTINUED | OUTPATIENT
Start: 2024-11-06 | End: 2024-11-06 | Stop reason: SDUPTHER

## 2024-11-06 RX ORDER — IBUPROFEN 600 MG/1
1 TABLET ORAL PRN
Status: DISCONTINUED | OUTPATIENT
Start: 2024-11-06 | End: 2024-11-06 | Stop reason: HOSPADM

## 2024-11-06 RX ORDER — SODIUM CHLORIDE, SODIUM LACTATE, POTASSIUM CHLORIDE, CALCIUM CHLORIDE 600; 310; 30; 20 MG/100ML; MG/100ML; MG/100ML; MG/100ML
INJECTION, SOLUTION INTRAVENOUS CONTINUOUS
Status: DISCONTINUED | OUTPATIENT
Start: 2024-11-06 | End: 2024-11-06 | Stop reason: HOSPADM

## 2024-11-06 RX ORDER — LIDOCAINE HYDROCHLORIDE 10 MG/ML
1 INJECTION, SOLUTION INFILTRATION; PERINEURAL
Status: DISCONTINUED | OUTPATIENT
Start: 2024-11-06 | End: 2024-11-06 | Stop reason: HOSPADM

## 2024-11-06 RX ADMIN — MIDAZOLAM 2 MG: 1 INJECTION INTRAMUSCULAR; INTRAVENOUS at 12:50

## 2024-11-06 RX ADMIN — OXYCODONE 5 MG: 5 TABLET ORAL at 14:49

## 2024-11-06 RX ADMIN — ACETAMINOPHEN 1000 MG: 500 TABLET, FILM COATED ORAL at 11:24

## 2024-11-06 RX ADMIN — PROPOFOL 200 MG: 10 INJECTION, EMULSION INTRAVENOUS at 13:08

## 2024-11-06 RX ADMIN — FENTANYL CITRATE 50 MCG: 50 INJECTION, SOLUTION INTRAMUSCULAR; INTRAVENOUS at 13:25

## 2024-11-06 RX ADMIN — KETAMINE HYDROCHLORIDE 20 MG: 50 INJECTION, SOLUTION INTRAMUSCULAR; INTRAVENOUS at 13:21

## 2024-11-06 RX ADMIN — ONDANSETRON 4 MG: 2 INJECTION INTRAMUSCULAR; INTRAVENOUS at 13:14

## 2024-11-06 RX ADMIN — FENTANYL CITRATE 50 MCG: 50 INJECTION, SOLUTION INTRAMUSCULAR; INTRAVENOUS at 13:20

## 2024-11-06 RX ADMIN — LIDOCAINE HYDROCHLORIDE 60 MG: 20 INJECTION, SOLUTION EPIDURAL; INFILTRATION; INTRACAUDAL; PERINEURAL at 13:08

## 2024-11-06 RX ADMIN — Medication 5 MG: at 13:08

## 2024-11-06 RX ADMIN — CEFAZOLIN 2000 MG: 2 INJECTION, POWDER, FOR SOLUTION INTRAMUSCULAR; INTRAVENOUS at 13:04

## 2024-11-06 RX ADMIN — LIDOCAINE HYDROCHLORIDE 3 ML: 10 INJECTION, SOLUTION INFILTRATION; PERINEURAL at 10:36

## 2024-11-06 RX ADMIN — DEXAMETHASONE SODIUM PHOSPHATE 10 MG: 10 INJECTION INTRAMUSCULAR; INTRAVENOUS at 13:14

## 2024-11-06 RX ADMIN — SODIUM CHLORIDE, PRESERVATIVE FREE 40 ML: 5 INJECTION INTRAVENOUS at 13:39

## 2024-11-06 ASSESSMENT — PAIN DESCRIPTION - LOCATION: LOCATION: BREAST

## 2024-11-06 ASSESSMENT — PAIN DESCRIPTION - ORIENTATION: ORIENTATION: LEFT

## 2024-11-06 ASSESSMENT — PAIN DESCRIPTION - DESCRIPTORS: DESCRIPTORS: SORE

## 2024-11-06 ASSESSMENT — PAIN SCALES - GENERAL
PAINLEVEL_OUTOF10: 4
PAINLEVEL_OUTOF10: 2
PAINLEVEL_OUTOF10: 4
PAINLEVEL_OUTOF10: 4

## 2024-11-06 ASSESSMENT — PAIN DESCRIPTION - PAIN TYPE: TYPE: SURGICAL PAIN

## 2024-11-06 ASSESSMENT — PAIN DESCRIPTION - ONSET: ONSET: GRADUAL

## 2024-11-06 NOTE — DISCHARGE INSTRUCTIONS
Dressings/Wound Care  Leave dressing alone until follow-up      Try to keep incision as dry as possible to lower risk of infection.    Activity  No heavy lifting (>5-10lbs) for 6 weeks to reduce risk of swelling.  No driving until you are off pain meds for 24hrs and have no pain with movements associated with driving.    Pain prescription (Norco) electronically sent to your pharmacy    Follow-up with Dr Barros in approx 2 weeks--Per Dr Barros request Thursday Nov 21 at 8:15am      --The hospital staff is to make this appt time for you before you leave the hospital  The appt is to be at:  [facility]  [address] , [address]  [phone];  option 1 for the [facility] office    Diet  Resume prior diet        After general anesthesia or intravenous sedation, for 24 hours or while taking prescription Narcotics:  Limit your activities  A responsible adult needs to be with you for the next 24 hours  Do not drive and operate hazardous machinery  Do not make important personal or business decisions  Do not drink alcoholic beverages  If you have not urinated within 8 hours after discharge, and you are experiencing discomfort from urinary retention, please go to the nearest ED.  If you have sleep apnea and have a CPAP machine, please use it for all naps and sleeping.  Please use caution when taking narcotics and any of your home medications that may cause drowsiness.  *  Please give a list of your current medications to your Primary Care Provider.  *  Please update this list whenever your medications are discontinued, doses are      changed, or new medications (including over-the-counter products) are added.  *  Please carry medication information at all times in case of emergency situations.    These are general instructions for a healthy lifestyle:  No smoking/ No tobacco products/ Avoid exposure to second hand smoke  Surgeon General's Warning:  Quitting smoking now greatly reduces serious risk

## 2024-11-06 NOTE — ANESTHESIA PROCEDURE NOTES
Airway  Date/Time: 11/6/2024 1:10 PM  Urgency: elective    Airway not difficult    General Information and Staff    Patient location during procedure: OR  Performed: resident/CRNA/CAA   Performed by: Lashay Cash APRN - CRNA  Authorized by: Pablo Ignacio MD      Indications and Patient Condition  Indications for airway management: anesthesia  Spontaneous Ventilation: absent  Sedation level: deep  Preoxygenated: yes  Patient position: sniffing  MILS not maintained throughout  Mask difficulty assessment: not attempted    Final Airway Details  Final airway type: supraglottic airway      Successful airway: oropharyngeal  Size 3     Number of attempts at approach: 1    no

## 2024-11-06 NOTE — OP NOTE
[place], [place] [address] [phone]    OPERATVE REPORT    Name: Sheila Wilson     Date of Surgery: 11/06/24    Med Record Number: [id number]  Age: 44 y.o.       Pre-operative Diagnosis:   Enhancing 8mm papilloma on MRI  High Risk Breast Patient     Post-operative Diagnosis: same    Procedure:   Left breast NL excisional biopsy (CPT 04862-AM).      Surgeon: ESA LOPEZ MD  Asistants: none  Anesthesia:  General  Complications: none  Specimen:   Breast mass to path    Estimated Blood Loss: <10cc  Drains: none       Findings:  Infection Present At Time Of Surgery (PATOS) (choose all levels that have infection present):  No infection present             Procedure Description:     The risks, benefits, potential complications, treatment options, and expected outcomes were discussed with the patient pre-operatively.    The patient voiced understanding and gave informed consent preoperatively.    The patient was taken to the Operating Room, and the [facility] time-out protocol checklist was followed.   After the induction of adequate anesthesia, the left breast was prepped and draped in the usual sterile fashion.    Preoperative antibiotics were given.      A left breast incision was made around the exit site of the guidewire in the lower outer left breast.  The breast tissues were markedly dense and fibrotic.  We performed a left NL excisional biopsy by dissecting and excising the breast tissue around the distal aspect of the guidewire.    We marked the specimen for orientation with a short suture at the superior margin and a long suture at the lateral margin  The specimen was imaged in 2 views, contained the guidewire, the biopsy clip, the radiographic target, was approved by radiology, and sent to pathology for review.  Irrigation was used.  Hemostasis was confirmed.  The left breast biopsy site was inspected.    There was no evidence of gross or palpable disease remaining.      A local  anesthetic was given.    The incision was closed with interrupted deep dermal 3-0 Vicryl sutures.    The skin was closed with clips.    A sterile dressing was placed at the end of the case.  The patient was taken to the recovery room in good condition.  She tolerated the procedure well.              ESA LOPEZ MD, FACS

## 2024-11-06 NOTE — ANESTHESIA POSTPROCEDURE EVALUATION
Department of Anesthesiology  Postprocedure Note    Patient: Sheila Wilson  MRN: [id number]  YOB: 1980  Date of evaluation: 11/6/2024    Procedure Summary       Date: 11/06/24 Room / [place] [facility] / [facility]    Anesthesia Start: 1249 Anesthesia Stop: 1349    Procedure: LEFT BREAST LUMPECTOMY WITH NEEDLE LOC OR Times  PreOp:       9:30 am Loc:           11:00 am OR:            1:00 pm (Left: Breast) Diagnosis:       Extremely dense tissue of both breasts on mammography      Papilloma of left breast      (Extremely dense tissue of both breasts on mammography [R92.343])      (Papilloma of left breast [D24.2])    Surgeons: Sascha Barros MD Responsible Provider: Pablo Ignacio MD    Anesthesia Type: General ASA Status: 2            Anesthesia Type: General    Silvestre Phase I: Silvestre Score: 10    Silvestre Phase II: Silvestre Score: 10    Anesthesia Post Evaluation    Patient location during evaluation: PACU  Patient participation: complete - patient participated  Level of consciousness: awake  Airway patency: patent  Nausea & Vomiting: no nausea  Cardiovascular status: hemodynamically stable  Respiratory status: acceptable, nonlabored ventilation and spontaneous ventilation  Hydration status: stable  Multimodal analgesia pain management approach    No notable events documented.

## 2024-11-06 NOTE — ANESTHESIA PRE PROCEDURE
Department of Anesthesiology  Preprocedure Note       Name:  Sheila Wilson   Age:  44 y.o.  :  1980                                          MRN:  [id number]         Date:  2024      Surgeon: Surgeon(s):  Sascha Barros MD    Procedure: Procedure(s):  LEFT BREAST LUMPECTOMY WITH NEEDLE LOC OR Times  PreOp:       9:30 am Loc:           11:00 am OR:            1:00 pm    Medications prior to admission:   Prior to Admission medications    Medication Sig Start Date End Date Taking? Authorizing Provider   tranexamic acid (LYSTEDA) 650 MG TABS tablet Take 2 tablets by mouth 3 times daily on the days you anticipate heavy menstrual bleeding.  Do not take this medication for more than 5 days.  Patient not taking: Reported on 10/22/2024 8/7/24   Liane Key MD   Paragard Intrauterine Copper IUD 1 each by IntraUTERine route once IUD insertion on 24    Provider, MD Radha   NONFORMULARY     Provider, MD Radha   meloxicam (MOBIC) 7.5 MG tablet Take 1 tablet by mouth in the morning and at bedtime for 14 days Take 1 tablet po BID for 2 weeks for pain and inflamation 12/27/23 1/10/24  Hao Campbell PA   naloxone (NARCAN) 4 MG/0.1ML LIQD nasal spray 1 spray by Nasal route as needed for Opioid Reversal  Patient not taking: Reported on 23   Hao Campbell PA   naproxen (EC-NAPROSYN) 500 MG EC tablet Take 30 minutes prior to IUD insertion  Patient not taking: No sig reported 22  Liane Key MD       Current medications:    Current Facility-Administered Medications   Medication Dose Route Frequency Provider Last Rate Last Admin    lidocaine 1 % injection 1 mL  1 mL IntraDERmal Once PRN Lanre Real MD        acetaminophen (TYLENOL) tablet 1,000 mg  1,000 mg Oral Once Lanre Real MD        fentaNYL (SUBLIMAZE) injection 100 mcg  100 mcg IntraVENous Once PRN Lanre Real MD        lactated ringers infusion   IntraVENous Continuous Lanre Real MD

## 2024-11-07 ENCOUNTER — TELEPHONE (OUTPATIENT)
Dept: OBGYN CLINIC | Age: 44
End: 2024-11-07

## 2024-11-07 NOTE — TELEPHONE ENCOUNTER
Karli from [facility] on the nurses line stating that she had talked with Dr. Key regarding Sheila's Tyrer-Cuzick score.     Karli stated that they reran the score and it came back with the same high-risk score. Sheila's paternal grandmother and paternal aunt were not included in this score as that information was not provided.    What contributed to the high-risk score was the fact that Sheila's mom had breast cancer at age 42, has no children, dense breast, and a papaloma.    Printed for Dr. Key.

## 2024-11-11 ENCOUNTER — PATIENT MESSAGE (OUTPATIENT)
Dept: OBGYN CLINIC | Age: 44
End: 2024-11-11

## 2024-11-11 DIAGNOSIS — Z30.2 ENCOUNTER FOR STERILIZATION: Primary | ICD-10-CM

## 2024-11-17 ENCOUNTER — PATIENT MESSAGE (OUTPATIENT)
Dept: OBGYN CLINIC | Age: 44
End: 2024-11-17

## 2024-11-21 ENCOUNTER — OFFICE VISIT (OUTPATIENT)
Dept: SURGERY | Age: 44
End: 2024-11-21

## 2024-11-21 VITALS — WEIGHT: 147 LBS | HEIGHT: 67 IN | BODY MASS INDEX: 23.07 KG/M2

## 2024-11-21 DIAGNOSIS — D24.2 PAPILLOMA OF LEFT BREAST: ICD-10-CM

## 2024-11-21 DIAGNOSIS — Z12.31 ENCOUNTER FOR SCREENING MAMMOGRAM FOR HIGH-RISK PATIENT: Primary | ICD-10-CM

## 2024-11-21 PROCEDURE — 99024 POSTOP FOLLOW-UP VISIT: CPT | Performed by: SURGERY

## 2024-11-21 NOTE — PROGRESS NOTES
H&P/Consult Note/Progress Note/Office Note:   Sheila Wilson  MRN: [id number]  :1980  Age:44 y.o.    HPI: Sheila Wilson is a 44 y.o. female who is s/p left breast NL excisional  biopsy on 24 for an enhancing left breast papilloma   in a high risk patient with extremely dense breast on mammogram.       Her path was benign as shown below:  Surgical Pathology Report   Left breast, lumpectomy: Benign sclerosing intraductal papilloma, completely excised.   Electronically Signed Out By Pierce Ash M.D.       She previously saw Dr. 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       I saw her for the 1st time on 24.  She had a possible asymmetry in the left breast on screening mammogram   which led to diagnostic imaging with US and identified a 1 cm mass at 3:00 4 cm from the nipple in the left breast.    US-guided left breast biopsy identified intraductal papilloma with moderate hyperplasia.    Her lifetime risk of breast cancer was calculated to be 31.23%  and the American Cancer Society recommends additional screening MRI in patients with risk over 20%.        Her mother had a h/o breast cancer in her 40s.   Paternal grandmother and paternal aunt with history of breast cancer.        24 bilat digital screening mammo with olga  Hx: Preliminary estimated lifetime risk of breast cancer for this patient is calculated to be 31.23% based on the Tyrer-Cuzick version 8 model.      This is considered high risk (>20%).  For high-risk patients, the American College of Radiology recommends considering additional screening with breast MRI.  For patients with heterogeneously dense or extremely dense breast tissue and/or

## 2024-11-26 NOTE — PROGRESS NOTES
[facility] Hematology and Oncology: Established patient - follow up     Chief Complaint   Patient presents with    Follow-up     Reason for Referral: Atypical hyperplasia of left breast  Referring Provider: Sanjuana Gilbert DO  Primary Care Provider: Lucio Nayak MD  Family History of Cancer/Hematologic Disorders: Family history is significant for mother with breast cancer diagnosed at 42 years old and unspecified family history of colon cancer.   Presenting Symptoms: Bloody left nipple discharge    History of Present Illness:  Ms. Wilson is a 44 y.o. female who presents today for follow up regarding atypical hyperplasia of left breast.  She initially presented for a diagnostic bilateral mammogram and left breast ultrasound on 6/21/22 after noticing one week of spontaneous bloody left nipple discharge.  There were no imaging findings suspicious for malignancy. Follow-up evaluation with a breast MRI was recommended because of the dense breast tissue, family history of breast cancer, and pathologic nipple discharge.  MRI of the bilateral breasts was completed on 6/28/22 with no evidence of malignancy in either breast and no significant ductal distention.  Radiologist suggested considering surgical referral if bloody nipple discharge persisted.  Patient was referred to Surgery and evaluated in consultation by Surgeon, Dr. Gilbert, on 8/4/22 with persistent bloody left nipple discharge.  Patient ultimately decided to undergo left breast duct excision, and she was taken to the OR on 8/24/22.  Final pathology from the left breast duct revealed fibrocystic mastopathy having duct ectasia in association with moderate intraductal hyperplasia.  Ms. Wilson is recovering well postoperatively and is now referred to [facility] for oncology evaluation and possible endocrine therapy.   - At consultation, we discussed the pathophysiology of breast cancer, and pre-cancerous pathology using visual aides.  We then reviewed her medical 
6

## 2024-12-04 ENCOUNTER — HOSPITAL ENCOUNTER (OUTPATIENT)
Dept: LAB | Age: 44
Discharge: HOME | End: 2024-12-04
Payer: COMMERCIAL

## 2024-12-04 ENCOUNTER — OFFICE VISIT (OUTPATIENT)
Dept: ONCOLOGY | Age: 44
End: 2024-12-04
Payer: COMMERCIAL

## 2024-12-04 VITALS
TEMPERATURE: 98.6 F | OXYGEN SATURATION: 99 % | HEIGHT: 68 IN | RESPIRATION RATE: 16 BRPM | SYSTOLIC BLOOD PRESSURE: 93 MMHG | WEIGHT: 149 LBS | BODY MASS INDEX: 22.58 KG/M2 | HEART RATE: 58 BPM | DIASTOLIC BLOOD PRESSURE: 59 MMHG

## 2024-12-04 DIAGNOSIS — D24.2 PAPILLOMA OF LEFT BREAST: ICD-10-CM

## 2024-12-04 DIAGNOSIS — N60.99 DUCTAL HYPERPLASIA OF BREAST: ICD-10-CM

## 2024-12-04 DIAGNOSIS — N60.99 DUCTAL HYPERPLASIA OF BREAST: Primary | ICD-10-CM

## 2024-12-04 LAB
ALBUMIN SERPL-MCNC: 3.7 G/DL (ref 3.5–5)
ALBUMIN/GLOB SERPL: 1 (ref 1–1.9)
ALP SERPL-CCNC: 77 U/L (ref 35–104)
ALT SERPL-CCNC: 15 U/L (ref 8–45)
ANION GAP SERPL CALC-SCNC: 8 MMOL/L (ref 7–16)
AST SERPL-CCNC: 24 U/L (ref 15–37)
BASOPHILS # BLD: 0 K/UL (ref 0–0.2)
BASOPHILS NFR BLD: 1 % (ref 0–2)
BILIRUB SERPL-MCNC: 0.3 MG/DL (ref 0–1.2)
BUN SERPL-MCNC: 12 MG/DL (ref 6–23)
CALCIUM SERPL-MCNC: 9.2 MG/DL (ref 8.8–10.2)
CHLORIDE SERPL-SCNC: 103 MMOL/L (ref 98–107)
CO2 SERPL-SCNC: 28 MMOL/L (ref 20–29)
CREAT SERPL-MCNC: 0.77 MG/DL (ref 0.6–1.1)
DIFFERENTIAL METHOD BLD: ABNORMAL
EOSINOPHIL # BLD: 0.1 K/UL (ref 0–0.8)
EOSINOPHIL NFR BLD: 2 % (ref 0.5–7.8)
ERYTHROCYTE [DISTWIDTH] IN BLOOD BY AUTOMATED COUNT: 12.9 % (ref 11.9–14.6)
GLOBULIN SER CALC-MCNC: 3.6 G/DL (ref 2.3–3.5)
GLUCOSE SERPL-MCNC: 89 MG/DL (ref 70–99)
HCT VFR BLD AUTO: 43.5 % (ref 35.8–46.3)
HGB BLD-MCNC: 14.5 G/DL (ref 11.7–15.4)
IMM GRANULOCYTES # BLD AUTO: 0 K/UL (ref 0–0.5)
IMM GRANULOCYTES NFR BLD AUTO: 0 % (ref 0–5)
LYMPHOCYTES # BLD: 2.8 K/UL (ref 0.5–4.6)
LYMPHOCYTES NFR BLD: 49 % (ref 13–44)
MCH RBC QN AUTO: 31.3 PG (ref 26.1–32.9)
MCHC RBC AUTO-ENTMCNC: 33.3 G/DL (ref 31.4–35)
MCV RBC AUTO: 94 FL (ref 82–102)
MONOCYTES # BLD: 0.4 K/UL (ref 0.1–1.3)
MONOCYTES NFR BLD: 8 % (ref 4–12)
NEUTS SEG # BLD: 2.3 K/UL (ref 1.7–8.2)
NEUTS SEG NFR BLD: 40 % (ref 43–78)
NRBC # BLD: 0 K/UL (ref 0–0.2)
PLATELET # BLD AUTO: 240 K/UL (ref 150–450)
PMV BLD AUTO: 10.1 FL (ref 9.4–12.3)
POTASSIUM SERPL-SCNC: 4 MMOL/L (ref 3.5–5.1)
PROT SERPL-MCNC: 7.3 G/DL (ref 6.3–8.2)
RBC # BLD AUTO: 4.63 M/UL (ref 4.05–5.2)
SODIUM SERPL-SCNC: 139 MMOL/L (ref 136–145)
WBC # BLD AUTO: 5.7 K/UL (ref 4.3–11.1)

## 2024-12-04 PROCEDURE — 80053 COMPREHEN METABOLIC PANEL: CPT

## 2024-12-04 PROCEDURE — 99213 OFFICE O/P EST LOW 20 MIN: CPT | Performed by: INTERNAL MEDICINE

## 2024-12-04 PROCEDURE — 85025 COMPLETE CBC W/AUTO DIFF WBC: CPT

## 2024-12-04 PROCEDURE — 36415 COLL VENOUS BLD VENIPUNCTURE: CPT

## 2024-12-04 RX ORDER — LORATADINE 10 MG/1
10 TABLET ORAL DAILY
COMMUNITY

## 2024-12-04 ASSESSMENT — PATIENT HEALTH QUESTIONNAIRE - PHQ9
SUM OF ALL RESPONSES TO PHQ QUESTIONS 1-9: 0
1. LITTLE INTEREST OR PLEASURE IN DOING THINGS: NOT AT ALL
SUM OF ALL RESPONSES TO PHQ9 QUESTIONS 1 & 2: 0
2. FEELING DOWN, DEPRESSED OR HOPELESS: NOT AT ALL
SUM OF ALL RESPONSES TO PHQ QUESTIONS 1-9: 0

## 2024-12-04 NOTE — PATIENT INSTRUCTIONS
Patient Information from Today's Visit    The members of your [facility] are listed below:    Physician Provider: Steph Renteria, Medical Oncologist  Advanced Practice Clinician: Mayelin Gar NP  Registered Nurse: Luisana SMART RN  Navigator: Teresa GAFFNEY RN or Lilian BENZ RN  Medical Assistant: Rosanna NICKERSON MA  : Angela PERKINS   Supportive Care Services: Neha EDDY LMSW    Diagnosis: Breast Hyperplasia      Follow Up Instructions: 6 months.    Labs reviewed.  Pathology reviewed.  Data regarding chemoprevention with Tamoxifen discussed - please let us know if you would like to start this.  Side effects discussed     Treatment Summary has been discussed and given to patient:N/A    Current Labs:   Hospital Outpatient Visit on 12/04/2024   Component Date Value Ref Range Status    WBC 12/04/2024 5.7  4.3 - 11.1 K/uL Final    RBC 12/04/2024 4.63  4.05 - 5.2 M/uL Final    Hemoglobin 12/04/2024 14.5  11.7 - 15.4 g/dL Final    Hematocrit 12/04/2024 43.5  35.8 - 46.3 % Final    MCV 12/04/2024 94.0  82.0 - 102.0 FL Final    MCH 12/04/2024 31.3  26.1 - 32.9 PG Final    MCHC 12/04/2024 33.3  31.4 - 35.0 g/dL Final    RDW 12/04/2024 12.9  11.9 - 14.6 % Final    Platelets 12/04/2024 240  150 - 450 K/uL Final    MPV 12/04/2024 10.1  9.4 - 12.3 FL Final    nRBC 12/04/2024 0.00  0.0 - 0.2 K/uL Final    **Note: Absolute NRBC parameter is now reported with Hemogram**    Neutrophils % 12/04/2024 40 (L)  43 - 78 % Final    Lymphocytes % 12/04/2024 49 (H)  13 - 44 % Final    Monocytes % 12/04/2024 8  4.0 - 12.0 % Final    Eosinophils % 12/04/2024 2  0.5 - 7.8 % Final    Basophils % 12/04/2024 1  0.0 - 2.0 % Final    Immature Granulocytes % 12/04/2024 0  0.0 - 5.0 % Final    Neutrophils Absolute 12/04/2024 2.3  1.7 - 8.2 K/UL Final    Lymphocytes Absolute 12/04/2024 2.8  0.5 - 4.6 K/UL Final    Monocytes Absolute 12/04/2024 0.4  0.1 - 1.3 K/UL Final    Eosinophils Absolute 12/04/2024 0.1  0.0 - 0.8 K/UL Final    Basophils

## 2024-12-12 ENCOUNTER — NURSE ONLY (OUTPATIENT)
Dept: OBGYN CLINIC | Age: 44
End: 2024-12-12
Payer: COMMERCIAL

## 2024-12-12 VITALS — BODY MASS INDEX: 23.87 KG/M2 | WEIGHT: 157 LBS

## 2024-12-12 DIAGNOSIS — R35.0 FREQUENCY OF URINATION: Primary | ICD-10-CM

## 2024-12-12 LAB
BILIRUBIN, URINE, POC: NEGATIVE
BLOOD URINE, POC: NEGATIVE
GLUCOSE URINE, POC: NEGATIVE
KETONES, URINE, POC: NEGATIVE
LEUKOCYTE ESTERASE, URINE, POC: NEGATIVE
NITRITE, URINE, POC: NORMAL
PH, URINE, POC: 7.5 (ref 4.6–8)
PROTEIN,URINE, POC: NEGATIVE
SPECIFIC GRAVITY, URINE, POC: 1.01 (ref 1–1.03)
URINALYSIS CLARITY, POC: CLEAR
URINALYSIS COLOR, POC: YELLOW
UROBILINOGEN, POC: NORMAL MG/DL

## 2024-12-12 PROCEDURE — 81002 URINALYSIS NONAUTO W/O SCOPE: CPT | Performed by: OBSTETRICS & GYNECOLOGY

## 2024-12-12 NOTE — PROGRESS NOTES
Per standing Physician orders, patient is here today for nurse only visit for UTI sx.  Pt is currently experiencing:  urgency and frequency X 2 days, not noticed today.     UA dip performed in office. WNL   weight is 71.2 kg (157 lb).

## 2024-12-15 LAB
BACTERIA SPEC CULT: ABNORMAL
BACTERIA SPEC CULT: ABNORMAL
SERVICE CMNT-IMP: ABNORMAL

## 2025-01-12 ENCOUNTER — PATIENT MESSAGE (OUTPATIENT)
Dept: OBGYN CLINIC | Age: 45
End: 2025-01-12

## 2025-01-12 DIAGNOSIS — R82.71 BACTERIURIA: Primary | ICD-10-CM

## 2025-01-12 RX ORDER — SULFAMETHOXAZOLE AND TRIMETHOPRIM 800; 160 MG/1; MG/1
1 TABLET ORAL 2 TIMES DAILY
Qty: 14 TABLET | Refills: 0 | Status: SHIPPED | OUTPATIENT
Start: 2025-01-12 | End: 2025-01-19

## 2025-01-12 RX ORDER — PHENAZOPYRIDINE HYDROCHLORIDE 200 MG/1
200 TABLET, FILM COATED ORAL 3 TIMES DAILY PRN
Qty: 9 TABLET | Refills: 0 | Status: SHIPPED | OUTPATIENT
Start: 2025-01-12 | End: 2025-01-15

## 2025-01-13 ENCOUNTER — TELEPHONE (OUTPATIENT)
Dept: OBGYN CLINIC | Age: 45
End: 2025-01-13

## 2025-01-27 NOTE — TELEPHONE ENCOUNTER
Called and spoke with patient, delivered Dr. Key's interpretation of most recent urine culture in regards to possible UTI. Pt stated was not having any symptoms, and declined doing another clean-catch urine at this time. I advised patient that is symptoms began again to reach out to us or for any other needs. Pt understood.

## 2025-06-09 ENCOUNTER — HOSPITAL ENCOUNTER (OUTPATIENT)
Dept: LAB | Age: 45
Discharge: HOME | End: 2025-06-09
Payer: COMMERCIAL

## 2025-06-09 DIAGNOSIS — N60.99 DUCTAL HYPERPLASIA OF BREAST: ICD-10-CM

## 2025-06-09 LAB
ALBUMIN SERPL-MCNC: 3.6 G/DL (ref 3.5–5)
ALBUMIN/GLOB SERPL: 1.1 (ref 1–1.9)
ALP SERPL-CCNC: 74 U/L (ref 35–104)
ALT SERPL-CCNC: 10 U/L (ref 8–45)
ANION GAP SERPL CALC-SCNC: 9 MMOL/L (ref 7–16)
AST SERPL-CCNC: 21 U/L (ref 15–37)
BASOPHILS # BLD: 0.05 K/UL (ref 0–0.2)
BASOPHILS NFR BLD: 0.8 % (ref 0–2)
BILIRUB SERPL-MCNC: 0.5 MG/DL (ref 0–1.2)
BUN SERPL-MCNC: 13 MG/DL (ref 6–23)
CALCIUM SERPL-MCNC: 9.4 MG/DL (ref 8.8–10.2)
CHLORIDE SERPL-SCNC: 102 MMOL/L (ref 98–107)
CO2 SERPL-SCNC: 24 MMOL/L (ref 20–29)
CREAT SERPL-MCNC: 0.81 MG/DL (ref 0.6–1.1)
DIFFERENTIAL METHOD BLD: ABNORMAL
EOSINOPHIL # BLD: 0.12 K/UL (ref 0–0.8)
EOSINOPHIL NFR BLD: 1.8 % (ref 0.5–7.8)
ERYTHROCYTE [DISTWIDTH] IN BLOOD BY AUTOMATED COUNT: 12.7 % (ref 11.9–14.6)
GLOBULIN SER CALC-MCNC: 3.3 G/DL (ref 2.3–3.5)
GLUCOSE SERPL-MCNC: 90 MG/DL (ref 70–99)
HCT VFR BLD AUTO: 41.5 % (ref 35.8–46.3)
HGB BLD-MCNC: 14.3 G/DL (ref 11.7–15.4)
IMM GRANULOCYTES # BLD AUTO: 0.01 K/UL (ref 0–0.5)
IMM GRANULOCYTES NFR BLD AUTO: 0.2 % (ref 0–5)
LYMPHOCYTES # BLD: 3.42 K/UL (ref 0.5–4.6)
LYMPHOCYTES NFR BLD: 52.5 % (ref 13–44)
MCH RBC QN AUTO: 32.1 PG (ref 26.1–32.9)
MCHC RBC AUTO-ENTMCNC: 34.5 G/DL (ref 31.4–35)
MCV RBC AUTO: 93 FL (ref 82–102)
MONOCYTES # BLD: 0.45 K/UL (ref 0.1–1.3)
MONOCYTES NFR BLD: 6.9 % (ref 4–12)
NEUTS SEG # BLD: 2.47 K/UL (ref 1.7–8.2)
NEUTS SEG NFR BLD: 37.8 % (ref 43–78)
NRBC # BLD: 0 K/UL (ref 0–0.2)
PLATELET # BLD AUTO: 249 K/UL (ref 150–450)
PMV BLD AUTO: 10.6 FL (ref 9.4–12.3)
POTASSIUM SERPL-SCNC: 4 MMOL/L (ref 3.5–5.1)
PROT SERPL-MCNC: 7 G/DL (ref 6.3–8.2)
RBC # BLD AUTO: 4.46 M/UL (ref 4.05–5.2)
SODIUM SERPL-SCNC: 135 MMOL/L (ref 136–145)
WBC # BLD AUTO: 6.5 K/UL (ref 4.3–11.1)

## 2025-06-09 PROCEDURE — 36415 COLL VENOUS BLD VENIPUNCTURE: CPT

## 2025-06-09 PROCEDURE — 85025 COMPLETE CBC W/AUTO DIFF WBC: CPT

## 2025-06-09 PROCEDURE — 80053 COMPREHEN METABOLIC PANEL: CPT

## 2025-06-10 ENCOUNTER — OFFICE VISIT (OUTPATIENT)
Dept: ONCOLOGY | Age: 45
End: 2025-06-10
Payer: COMMERCIAL

## 2025-06-10 VITALS
OXYGEN SATURATION: 96 % | DIASTOLIC BLOOD PRESSURE: 58 MMHG | HEIGHT: 68 IN | BODY MASS INDEX: 23.92 KG/M2 | SYSTOLIC BLOOD PRESSURE: 105 MMHG | RESPIRATION RATE: 18 BRPM | TEMPERATURE: 98.1 F | HEART RATE: 56 BPM | WEIGHT: 157.8 LBS

## 2025-06-10 DIAGNOSIS — N60.99 DUCTAL HYPERPLASIA OF BREAST: Primary | ICD-10-CM

## 2025-06-10 PROCEDURE — 99214 OFFICE O/P EST MOD 30 MIN: CPT | Performed by: NURSE PRACTITIONER

## 2025-06-10 ASSESSMENT — ENCOUNTER SYMPTOMS
BLOOD IN STOOL: 0
VOICE CHANGE: 0
SORE THROAT: 0
ABDOMINAL PAIN: 0
ABDOMINAL DISTENTION: 0
SHORTNESS OF BREATH: 0
CHEST TIGHTNESS: 0
TROUBLE SWALLOWING: 0
NAUSEA: 0
SCLERAL ICTERUS: 0
WHEEZING: 0
VOMITING: 0
CONSTIPATION: 0
DIARRHEA: 0
HEMOPTYSIS: 0

## 2025-06-10 NOTE — PROGRESS NOTES
and Sexual Activity    Alcohol use: Yes     Alcohol/week: 5.0 standard drinks of alcohol     Types: 5 Glasses of wine per week     Comment: About half a glass of wine a night    Drug use: Never    Sexual activity: Not Currently     Partners: Male     Social Drivers of Health     Financial Resource Strain: Low Risk  (12/20/2023)    Overall Financial Resource Strain (CARDIA)     Difficulty of Paying Living Expenses: Not hard at all   Food Insecurity: No Food Insecurity (12/20/2023)    Hunger Vital Sign     Worried About Running Out of Food in the Last Year: Never true     Ran Out of Food in the Last Year: Never true   Transportation Needs: Unknown (12/20/2023)    PRAPARE - Transportation     Lack of Transportation (Non-Medical): No   Physical Activity: Insufficiently Active (12/18/2023)    Exercise Vital Sign     Days of Exercise per Week: 7 days     Minutes of Exercise per Session: 10 min   Social Connections: Unknown (6/22/2022)    Received from [email or web address]@    Social Connections     Frequency of Communication with Friends and Family: Not asked     Frequency of Social Gatherings with Friends and Family: Not asked   Intimate Partner Violence: Unknown (6/22/2022)    Received from [employer]@    Intimate Partner Violence     Fear of Current or Ex-Partner: Not asked     Emotionally Abused: Not asked     Physically Abused: Not asked     Sexually Abused: Not asked   Housing Stability: Unknown (12/20/2023)    Housing Stability Vital Sign     Unstable Housing in the Last Year: No      Review of Systems   Constitutional:  Negative for appetite change, chills, diaphoresis, fatigue, fever and unexpected weight change.   HENT:   Negative for hearing loss, mouth sores, nosebleeds, sore throat, trouble swallowing and voice change.    Eyes:  Negative for icterus.   Respiratory:  Negative for chest tightness, hemoptysis, shortness of breath and wheezing.    Cardiovascular:  Negative for chest pain,

## 2025-06-16 ENCOUNTER — HOSPITAL ENCOUNTER (OUTPATIENT)
Dept: MAMMOGRAPHY | Age: 45
Discharge: HOME | End: 2025-06-19
Attending: SURGERY
Payer: COMMERCIAL

## 2025-06-16 VITALS — HEIGHT: 68 IN | WEIGHT: 155 LBS | BODY MASS INDEX: 23.49 KG/M2

## 2025-06-16 DIAGNOSIS — Z12.31 ENCOUNTER FOR SCREENING MAMMOGRAM FOR HIGH-RISK PATIENT: ICD-10-CM

## 2025-06-16 PROCEDURE — 77063 BREAST TOMOSYNTHESIS BI: CPT

## 2025-06-19 ENCOUNTER — OFFICE VISIT (OUTPATIENT)
Dept: SURGERY | Age: 45
End: 2025-06-19
Payer: COMMERCIAL

## 2025-06-19 VITALS — WEIGHT: 155 LBS | HEIGHT: 68 IN | BODY MASS INDEX: 23.49 KG/M2

## 2025-06-19 DIAGNOSIS — R92.343 EXTREMELY DENSE TISSUE OF BOTH BREASTS ON MAMMOGRAPHY: ICD-10-CM

## 2025-06-19 DIAGNOSIS — N60.99 DUCTAL HYPERPLASIA OF BREAST: ICD-10-CM

## 2025-06-19 DIAGNOSIS — R92.8 ABNORMAL MAGNETIC RESONANCE IMAGING OF LEFT BREAST: ICD-10-CM

## 2025-06-19 DIAGNOSIS — D24.2 PAPILLOMA OF LEFT BREAST: Primary | ICD-10-CM

## 2025-06-19 DIAGNOSIS — Z91.89 AT HIGH RISK FOR BREAST CANCER: ICD-10-CM

## 2025-06-19 PROCEDURE — 99213 OFFICE O/P EST LOW 20 MIN: CPT | Performed by: SURGERY

## 2025-06-19 NOTE — PROGRESS NOTES
H&P/Consult Note/Progress Note/Office Note:   Sheila Wilson  MRN: [id number]  :1980  Age:44 y.o.    HPI: Sheila Wilson is a 44 y.o. female who is s/p left breast NL excisional  biopsy on 24 for an enhancing left breast papilloma   in a high risk patient with extremely dense breast on mammogram.       Her path was benign as shown below:  Surgical Pathology Report   Left breast, lumpectomy: Benign sclerosing intraductal papilloma, completely excised.   Electronically Signed Out By Pierce Ash M.D.       She previously saw Dr. 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       I saw her for the 1st time on 24.  She had a possible asymmetry in the left breast on screening mammogram   which led to diagnostic imaging with US and identified a 1 cm mass at 3:00 4 cm from the nipple in the left breast.    US-guided left breast biopsy identified intraductal papilloma with moderate hyperplasia.    Her lifetime risk of breast cancer was calculated to be 31.23%  and the American Cancer Society recommends additional screening MRI in patients with risk over 20%.        Her mother had a h/o breast cancer in her 40s.   Paternal grandmother and paternal aunt with history of breast cancer.        24 bilat digital screening mammo with olga  Hx: Preliminary estimated lifetime risk of breast cancer for this patient is calculated to be 31.23% based on the Tyrer-Cuzick version 8 model.      This is considered high risk (>20%).  For high-risk patients, the American College of Radiology recommends considering additional screening with breast MRI.  For patients with heterogeneously dense or extremely dense breast tissue and/or

## (undated) DEVICE — PAD FLR CLN ABSORBENT 46X40 IN IMPERV BLU QUIK SUITE LTX

## (undated) DEVICE — 3M™ STERI-DRAPE™ U-DRAPE 1015: Brand: STERI-DRAPE™

## (undated) DEVICE — GLOVE SURG SZ 7 L12IN FNGR THK79MIL GRN LTX FREE

## (undated) DEVICE — SOLUTION IRRIG 1000ML 0.9% SOD CHL USP POUR PLAS BTL

## (undated) DEVICE — [THREADED CANNULA.  DO NOT RESTERILIZE,  DO NOT USE IF PACKAGE IS DAMAGED]: Brand: DRI-LOK

## (undated) DEVICE — BLADE SHV L13CM DIA4MM BNE CUT AGG DEB COOLCUT

## (undated) DEVICE — 4-PORT MANIFOLD: Brand: NEPTUNE 2

## (undated) DEVICE — INTENDED FOR TISSUE SEPARATION, AND OTHER PROCEDURES THAT REQUIRE A SHARP SURGICAL BLADE TO PUNCTURE OR CUT.: Brand: BARD-PARKER ® STAINLESS STEEL BLADES

## (undated) DEVICE — STERILE PVP: Brand: MEDLINE INDUSTRIES, INC.

## (undated) DEVICE — SHOULDER ARTHRO DR KOCH: Brand: MEDLINE INDUSTRIES, INC.

## (undated) DEVICE — MINOR SPLIT GENERAL: Brand: MEDLINE INDUSTRIES, INC.

## (undated) DEVICE — GLOVE SURG SZ 65 CRM LTX FREE POLYISOPRENE POLYMER BEAD ANTI

## (undated) DEVICE — 3M™ TEGADERM™ TRANSPARENT FILM DRESSING FRAME STYLE, 1626W, 4 IN X 4-3/4 IN (10 CM X 12 CM), 50/CT 4CT/CASE: Brand: 3M™ TEGADERM™

## (undated) DEVICE — PROBE ABLAT XL 90DEG ASPIR BPLR RF 1 PC ELECTRD ERGO HNDL

## (undated) DEVICE — GLOVE ORANGE PI 7   MSG9070

## (undated) DEVICE — SOLUTION IRRIG 3000ML 0.9% SOD CHL USP UROMATIC PLAS CONT

## (undated) DEVICE — SOFT SILICONE HYDROCELLULAR FOAM DRESSING WITH LOCK AWAY LAYER: Brand: ALLEVYN LIFE XL 21X21 CTN10

## (undated) DEVICE — SUTURE FIBERLOOP SZ 2-0 L20IN NONABSORBABLE BLU BRAID AR7234C

## (undated) DEVICE — STRIP,CLOSURE,WOUND,MEDI-STRIP,1/2X4: Brand: MEDLINE

## (undated) DEVICE — SUTURE VICRYL SZ 3-0 L18IN ABSRB UD L26MM SH 1/2 CIR J864D

## (undated) DEVICE — NEEDLE HYPO 21GA L1.5IN INTRAMUSCULAR S STL LATCH BVL UP

## (undated) DEVICE — SHOULDER STABILIZATION KIT,                                    DISPOSABLE 12 PER BOX

## (undated) DEVICE — SURGICAL PROCEDURE PACK BASIC ST FRANCIS

## (undated) DEVICE — WIRE CUTTER, STERILE (WCS144): Brand: CENTURION MEDICAL PRODUCTS CORP

## (undated) DEVICE — APPLICATOR MEDICATED 26 CC SOLUTION HI LT ORNG CHLORAPREP

## (undated) DEVICE — SWITCH DRAPE TENET 7633

## (undated) DEVICE — GARMENT,MEDLINE,DVT,INT,CALF,MED, GEN2: Brand: MEDLINE

## (undated) DEVICE — ELECTRODE PT RET AD L9FT HI MOIST COND ADH HYDRGEL CORDED

## (undated) DEVICE — GLOVE SURG SZ 65 THK91MIL LTX FREE SYN POLYISOPRENE

## (undated) DEVICE — PAD,NON-ADHERENT,3X8,STERILE,LF,1/PK: Brand: MEDLINE

## (undated) DEVICE — Device

## (undated) DEVICE — KIT DISP W/ SPEAR TRCR TIP OBT AND 2.6MM DRL FOR 2.6

## (undated) DEVICE — CANISTER, RIGID, 2000CC: Brand: MEDLINE INDUSTRIES, INC.

## (undated) DEVICE — APPLIER CLP L9.38IN M LIG TI DISP STR RNG HNDL LIGACLP

## (undated) DEVICE — SHEET, ORTHO, SPLIT, STERILE: Brand: MEDLINE

## (undated) DEVICE — TUBING PMP L16FT MAIN DISP FOR AR-6400 AR-6475

## (undated) DEVICE — SUTURE MCRYL SZ 2-0 L27IN ABSRB UD CP-1 1 L36MM 1/2 CIR REV Y266H